# Patient Record
Sex: FEMALE | Race: BLACK OR AFRICAN AMERICAN | NOT HISPANIC OR LATINO | Employment: OTHER | URBAN - METROPOLITAN AREA
[De-identification: names, ages, dates, MRNs, and addresses within clinical notes are randomized per-mention and may not be internally consistent; named-entity substitution may affect disease eponyms.]

---

## 2017-01-10 ENCOUNTER — PATIENT OUTREACH (OUTPATIENT)
Dept: ADMINISTRATIVE | Facility: HOSPITAL | Age: 72
End: 2017-01-10

## 2017-01-10 DIAGNOSIS — E11.59 HYPERTENSION ASSOCIATED WITH DIABETES: Primary | ICD-10-CM

## 2017-01-10 DIAGNOSIS — I15.2 HYPERTENSION ASSOCIATED WITH DIABETES: Primary | ICD-10-CM

## 2017-01-10 RX ORDER — GLIMEPIRIDE 1 MG/1
TABLET ORAL
Qty: 90 TABLET | Refills: 3 | Status: SHIPPED | OUTPATIENT
Start: 2017-01-10 | End: 2017-05-16 | Stop reason: DRUGHIGH

## 2017-01-11 ENCOUNTER — OFFICE VISIT (OUTPATIENT)
Dept: FAMILY MEDICINE | Facility: CLINIC | Age: 72
End: 2017-01-11
Payer: MEDICARE

## 2017-01-11 ENCOUNTER — LAB VISIT (OUTPATIENT)
Dept: LAB | Facility: HOSPITAL | Age: 72
End: 2017-01-11
Attending: FAMILY MEDICINE
Payer: MEDICARE

## 2017-01-11 VITALS
TEMPERATURE: 98 F | HEART RATE: 84 BPM | HEIGHT: 65 IN | BODY MASS INDEX: 29.66 KG/M2 | DIASTOLIC BLOOD PRESSURE: 60 MMHG | SYSTOLIC BLOOD PRESSURE: 100 MMHG | WEIGHT: 178 LBS

## 2017-01-11 DIAGNOSIS — N18.30 DIABETES MELLITUS WITH STAGE 3 CHRONIC KIDNEY DISEASE: ICD-10-CM

## 2017-01-11 DIAGNOSIS — E11.9 TYPE 2 DIABETES MELLITUS WITHOUT COMPLICATION: ICD-10-CM

## 2017-01-11 DIAGNOSIS — E11.59 HYPERTENSION ASSOCIATED WITH DIABETES: ICD-10-CM

## 2017-01-11 DIAGNOSIS — I15.2 HYPERTENSION ASSOCIATED WITH DIABETES: ICD-10-CM

## 2017-01-11 DIAGNOSIS — E61.1 IRON DEFICIENCY: ICD-10-CM

## 2017-01-11 DIAGNOSIS — K59.00 CONSTIPATION, UNSPECIFIED CONSTIPATION TYPE: ICD-10-CM

## 2017-01-11 DIAGNOSIS — R30.0 DYSURIA: ICD-10-CM

## 2017-01-11 DIAGNOSIS — N39.0 URINARY TRACT INFECTION WITHOUT HEMATURIA, SITE UNSPECIFIED: Primary | ICD-10-CM

## 2017-01-11 DIAGNOSIS — E11.22 DIABETES MELLITUS WITH STAGE 3 CHRONIC KIDNEY DISEASE: ICD-10-CM

## 2017-01-11 LAB
ALBUMIN SERPL BCP-MCNC: 3.8 G/DL
ALP SERPL-CCNC: 104 U/L
ALT SERPL W/O P-5'-P-CCNC: 7 U/L
ANION GAP SERPL CALC-SCNC: 7 MMOL/L
AST SERPL-CCNC: 14 U/L
BACTERIA #/AREA URNS HPF: ABNORMAL /HPF
BASOPHILS # BLD AUTO: 0.01 K/UL
BASOPHILS NFR BLD: 0.2 %
BILIRUB SERPL-MCNC: 0.3 MG/DL
BILIRUB UR QL STRIP: ABNORMAL
BUN SERPL-MCNC: 21 MG/DL
CALCIUM SERPL-MCNC: 9.8 MG/DL
CHLORIDE SERPL-SCNC: 105 MMOL/L
CLARITY UR: ABNORMAL
CO2 SERPL-SCNC: 28 MMOL/L
COLOR UR: ABNORMAL
CREAT SERPL-MCNC: 1.3 MG/DL
DIFFERENTIAL METHOD: ABNORMAL
EOSINOPHIL # BLD AUTO: 0.1 K/UL
EOSINOPHIL NFR BLD: 1.9 %
ERYTHROCYTE [DISTWIDTH] IN BLOOD BY AUTOMATED COUNT: 12.8 %
ERYTHROCYTE [SEDIMENTATION RATE] IN BLOOD BY WESTERGREN METHOD: 27 MM/HR
EST. GFR  (AFRICAN AMERICAN): 48.4 ML/MIN/1.73 M^2
EST. GFR  (NON AFRICAN AMERICAN): 42 ML/MIN/1.73 M^2
FERRITIN SERPL-MCNC: 220 NG/ML
GLUCOSE SERPL-MCNC: 128 MG/DL
GLUCOSE UR QL STRIP: ABNORMAL
HCT VFR BLD AUTO: 37.5 %
HGB BLD-MCNC: 11.8 G/DL
HGB UR QL STRIP: ABNORMAL
IRON SERPL-MCNC: 64 UG/DL
KETONES UR QL STRIP: ABNORMAL
LEUKOCYTE ESTERASE UR QL STRIP: ABNORMAL
LYMPHOCYTES # BLD AUTO: 2.1 K/UL
LYMPHOCYTES NFR BLD: 36.5 %
MCH RBC QN AUTO: 29.4 PG
MCHC RBC AUTO-ENTMCNC: 31.5 %
MCV RBC AUTO: 94 FL
MICROSCOPIC COMMENT: ABNORMAL
MONOCYTES # BLD AUTO: 0.3 K/UL
MONOCYTES NFR BLD: 5.7 %
NEUTROPHILS # BLD AUTO: 3.3 K/UL
NEUTROPHILS NFR BLD: 55.7 %
NITRITE UR QL STRIP: ABNORMAL
PH UR STRIP: ABNORMAL [PH] (ref 5–8)
PLATELET # BLD AUTO: 143 K/UL
PMV BLD AUTO: 10.5 FL
POTASSIUM SERPL-SCNC: 4.6 MMOL/L
PROT SERPL-MCNC: 7.9 G/DL
PROT UR QL STRIP: ABNORMAL
RBC # BLD AUTO: 4.01 M/UL
RBC #/AREA URNS HPF: 20 /HPF (ref 0–4)
SATURATED IRON: 18 %
SODIUM SERPL-SCNC: 140 MMOL/L
SP GR UR STRIP: ABNORMAL (ref 1–1.03)
SQUAMOUS #/AREA URNS HPF: 5 /HPF
TOTAL IRON BINDING CAPACITY: 351 UG/DL
TRANSFERRIN SERPL-MCNC: 237 MG/DL
URN SPEC COLLECT METH UR: ABNORMAL
WBC # BLD AUTO: 5.84 K/UL
WBC #/AREA URNS HPF: >100 /HPF (ref 0–5)
WBC CLUMPS URNS QL MICRO: ABNORMAL

## 2017-01-11 PROCEDURE — 99214 OFFICE O/P EST MOD 30 MIN: CPT | Mod: PBBFAC,PO | Performed by: FAMILY MEDICINE

## 2017-01-11 PROCEDURE — 87186 SC STD MICRODIL/AGAR DIL: CPT

## 2017-01-11 PROCEDURE — 87147 CULTURE TYPE IMMUNOLOGIC: CPT

## 2017-01-11 PROCEDURE — 87086 URINE CULTURE/COLONY COUNT: CPT

## 2017-01-11 PROCEDURE — 87088 URINE BACTERIA CULTURE: CPT

## 2017-01-11 PROCEDURE — 99999 PR PBB SHADOW E&M-EST. PATIENT-LVL IV: CPT | Mod: PBBFAC,,, | Performed by: FAMILY MEDICINE

## 2017-01-11 PROCEDURE — 87077 CULTURE AEROBIC IDENTIFY: CPT

## 2017-01-11 PROCEDURE — 99214 OFFICE O/P EST MOD 30 MIN: CPT | Mod: S$PBB,,, | Performed by: FAMILY MEDICINE

## 2017-01-11 PROCEDURE — 81000 URINALYSIS NONAUTO W/SCOPE: CPT | Mod: PO

## 2017-01-11 RX ORDER — POLYETHYLENE GLYCOL 3350 17 G/17G
17 POWDER, FOR SOLUTION ORAL DAILY
Qty: 510 G | COMMUNITY
Start: 2017-01-11 | End: 2019-03-14

## 2017-01-11 RX ORDER — TRAMADOL HYDROCHLORIDE 50 MG/1
50 TABLET ORAL EVERY 8 HOURS PRN
Qty: 30 TABLET | Refills: 0 | Status: SHIPPED | OUTPATIENT
Start: 2017-01-11 | End: 2017-07-06 | Stop reason: SDUPTHER

## 2017-01-11 RX ORDER — CIPROFLOXACIN 250 MG/1
250 TABLET, FILM COATED ORAL 2 TIMES DAILY
Qty: 14 TABLET | Refills: 0 | Status: SHIPPED | OUTPATIENT
Start: 2017-01-11 | End: 2017-01-18

## 2017-01-11 NOTE — MR AVS SNAPSHOT
[facility]  [address] [address] [facility] [address]  Phone: [phone]  Fax: [phone]                  Eunice Hutson   2017 4:00 PM   Office Visit    Description:  Female : 1945   Provider:  Parminder Sahni MD   Department:  [facility]           Reason for Visit     Dysuria           Diagnoses this Visit        Comments    Urinary tract infection without hematuria, site unspecified    -  Primary     Dysuria         Diabetes mellitus with stage 3 chronic kidney disease         Hypertension associated with diabetes         Constipation, unspecified constipation type                To Do List           Future Appointments        Provider Department Dept Phone    2017 2:00 PM Tay Yates MD [facility] On[facility] [phone]      Goals (5 Years of Data)     None       These Medications        Disp Refills Start End    tramadol (ULTRAM) 50 mg tablet 30 tablet 0 2017     Take 1 tablet (50 mg total) by mouth every 8 (eight) hours as needed. - Oral    Pharmacy: [facility] [address] Ph #: [phone]       ciprofloxacin HCl (CIPRO) 250 MG tablet 14 tablet 0 2017    Take 1 tablet (250 mg total) by mouth 2 (two) times daily. - Oral    Pharmacy: [address] Ph #: [phone]         PURCHASE these Medications (No prescription required)        Start End    polyethylene glycol (GLYCOLAX) 17 gram/dose powder 2017     Sig - Route: Take 17 g by mouth once daily. Mix in water or juice - Oral    Class: OTC      [facility]     [facility] On Call Nurse Care Line -  Assistance  Registered nurses in the [facility] provide clinical advisement, health education, appointment booking, and other advisory services.  Call for this free service at [phone].             Medications           Message regarding  Medications     Verify the changes and/or additions to your medication regime listed below are the same as discussed with your clinician today.  If any of these changes or additions are incorrect, please notify your healthcare provider.        START taking these NEW medications        Refills    ciprofloxacin HCl (CIPRO) 250 MG tablet 0    Sig: Take 1 tablet (250 mg total) by mouth 2 (two) times daily.    Class: Normal    Route: Oral    polyethylene glycol (GLYCOLAX) 17 gram/dose powder prn    Sig: Take 17 g by mouth once daily. Mix in water or juice    Class: OTC    Route: Oral      STOP taking these medications     UNKNOWN TO PATIENT unk chol med           Verify that the below list of medications is an accurate representation of the medications you are currently taking.  If none reported, the list may be blank. If incorrect, please contact your healthcare provider. Carry this list with you in case of emergency.           Current Medications     calcium-vitamin D3 (CALCIUM 500 + D) 500 mg(1,250mg) -200 unit per tablet Take 1 tablet by mouth 2 (two) times daily with meals.    cetirizine (ZYRTEC) 10 MG tablet Take 1 tablet (10 mg total) by mouth once daily.    ciprofloxacin HCl (CIPRO) 250 MG tablet Take 1 tablet (250 mg total) by mouth 2 (two) times daily.    cyclobenzaprine (FLEXERIL) 5 MG tablet Take 1 tablet (5 mg total) by mouth 3 (three) times daily as needed for Muscle spasms.    docusate calcium (SURFAK) 240 mg capsule Take by mouth.    gabapentin (NEURONTIN) 300 MG capsule TAKE ONE CAPSULE BY MOUTH TWICE DAILY    glimepiride (AMARYL) 1 MG tablet TAKE ONE TABLET BY MOUTH DAILY BEFORE BREAKFAST    latanoprost (XALATAN) 0.005 % ophthalmic solution Place 1 drop into both eyes every evening.      lisinopril (PRINIVIL,ZESTRIL) 20 MG tablet TAKE ONE TABLET BY MOUTH EVERY DAY    nystatin (MYCOSTATIN) cream Apply topically 2 (two) times daily as needed.    omeprazole (PRILOSEC) 20 MG capsule TAKE TWO CAPSULES BY  "MOUTH EVERY DAY    ondansetron (ZOFRAN) 4 MG tablet TAKE ONE TABLET BY MOUTH EVERY 8 HOURS AS NEEDED FOR NAUSEA    polyethylene glycol (GLYCOLAX) 17 gram/dose powder Take 17 g by mouth once daily. Mix in water or juice    PROCTOZONE-HC 2.5 % rectal cream PLACE RECTALLY TWICE DAILY    ropinirole (REQUIP) 1 MG tablet TAKE ONE TABLET BY MOUTH NIGHTLY    sitagliptan-metformin (JANUMET) 50-1,000 mg per tablet Take 1 tablet by mouth.    tramadol (ULTRAM) 50 mg tablet Take 1 tablet (50 mg total) by mouth every 8 (eight) hours as needed.    triamcinolone acetonide 0.1% (KENALOG) 0.1 % cream Apply topically 2 (two) times daily.           Clinical Reference Information           Vital Signs - Last Recorded  Most recent update: 1/11/2017  3:59 PM by Zahraa Hdz LPN    BP Pulse Temp Ht Wt BMI    100/60 84 97.8 °F (36.6 °C) (Oral) 5' 5" (1.651 m) 80.7 kg (178 lb) 29.62 kg/m2      Blood Pressure          Most Recent Value    BP  100/60      Allergies as of 1/11/2017     Caffeine    Codeine    Lipitor [Atorvastatin]    Pravastatin      Immunizations Administered on Date of Encounter - 1/11/2017     None      Orders Placed During Today's Visit      Normal Orders This Visit    Urinalysis Microscopic     URINALYSIS     Future Labs/Procedures Expected by Expires    Urine culture  As directed 1/11/2018      "

## 2017-01-12 ENCOUNTER — TELEPHONE (OUTPATIENT)
Dept: HEMATOLOGY/ONCOLOGY | Facility: CLINIC | Age: 72
End: 2017-01-12

## 2017-01-12 DIAGNOSIS — D50.9 IRON DEFICIENCY ANEMIA, UNSPECIFIED IRON DEFICIENCY ANEMIA TYPE: Primary | ICD-10-CM

## 2017-01-12 LAB
ESTIMATED AVG GLUCOSE: 148 MG/DL
HBA1C MFR BLD HPLC: 6.8 %

## 2017-01-12 NOTE — TELEPHONE ENCOUNTER
----- Message from Tay Yates MD sent at 1/12/2017  8:14 AM CST -----  Please let the patient know that her recent labwork check by me looked okay with good iron levels and good blood count.  I would like to see the patient back in 4 months at [facility] with CBC, iron studies, ESR, ferritin done 2 days before clinic visit.  Thank you.

## 2017-01-12 NOTE — PROGRESS NOTES
complains of dysuria, frequency, urgency. Symptoms over the past 2 wk. No fever or chills. No significant abdominal or back pain. Took leftover bactrim from son x 3 d and sx resolved, but recurred.  DM borderline, HTN controlled.  Lab Results   Component Value Date    HGBA1C 7.5 (H) 10/10/2016     Chronic constipation.  No weight changes or abdominal pain.  No rectal bleeding.  Tramadol used infrequently joint pains.  Requested refill    Past Medical History:  Past Medical History   Diagnosis Date    Anemia     Blood transfusion     Chronic gastritis     Depression     Diabetes mellitus with stage 3 chronic kidney disease     Diverticulosis     DM type 2 (diabetes mellitus, type 2) 2/27/2012    GERD (gastroesophageal reflux disease)     Glaucoma (increased eye pressure)     Hyperlipidemia LDL goal < 100 2/27/2012    Hypertension goal BP (blood pressure) < 130/80 2/27/2012    RLS (restless legs syndrome)      Past Surgical History   Procedure Laterality Date    Tubal ligation      Breast biopsy      Vaginal delivery       times 2    Back surgery  01/05/2011    Shoulder arthroscopy      Esophagogastroduodenoscopy  11/17/2011    Colonoscopy  11/16/2011    Eye surgery      Foot surgery        derotational arthroplasty of the fifth digit right foot     Esophagogastroduodenoscopy  1/16/2013,2/29/16    Colonoscopy  1/24/1013    Hemorrhoid surgery       Review of patient's allergies indicates:   Allergen Reactions    Caffeine Other (See Comments)     Makes hyper    Codeine      Other reaction(s): Rash  Able to take Lortab    Lipitor [atorvastatin] Other (See Comments)     Leg cramps    Pravastatin Other (See Comments)     Leg cramps     Current Outpatient Prescriptions on File Prior to Visit   Medication Sig Dispense Refill    calcium-vitamin D3 (CALCIUM 500 + D) 500 mg(1,250mg) -200 unit per tablet Take 1 tablet by mouth 2 (two) times daily with meals.      cyclobenzaprine (FLEXERIL) 5 MG  tablet Take 1 tablet (5 mg total) by mouth 3 (three) times daily as needed for Muscle spasms. 30 tablet 1    docusate calcium (SURFAK) 240 mg capsule Take by mouth.      gabapentin (NEURONTIN) 300 MG capsule TAKE ONE CAPSULE BY MOUTH TWICE DAILY 180 capsule 0    glimepiride (AMARYL) 1 MG tablet TAKE ONE TABLET BY MOUTH DAILY BEFORE BREAKFAST 90 tablet 3    latanoprost (XALATAN) 0.005 % ophthalmic solution Place 1 drop into both eyes every evening.        lisinopril (PRINIVIL,ZESTRIL) 20 MG tablet TAKE ONE TABLET BY MOUTH EVERY DAY 90 tablet 3    nystatin (MYCOSTATIN) cream Apply topically 2 (two) times daily as needed. 30 g 1    omeprazole (PRILOSEC) 20 MG capsule TAKE TWO CAPSULES BY MOUTH EVERY  capsule 3    ondansetron (ZOFRAN) 4 MG tablet TAKE ONE TABLET BY MOUTH EVERY 8 HOURS AS NEEDED FOR NAUSEA 12 tablet 0    PROCTOZONE-HC 2.5 % rectal cream PLACE RECTALLY TWICE DAILY 30 g 1    ropinirole (REQUIP) 1 MG tablet TAKE ONE TABLET BY MOUTH NIGHTLY 90 tablet 3    sitagliptan-metformin (JANUMET) 50-1,000 mg per tablet Take 1 tablet by mouth.      triamcinolone acetonide 0.1% (KENALOG) 0.1 % cream Apply topically 2 (two) times daily. 30 g 1    [DISCONTINUED] cyclobenzaprine (FLEXERIL) 10 MG tablet Take 10 mg by mouth.      [DISCONTINUED] glimepiride (AMARYL) 1 MG tablet Take 1 mg by mouth 2 (two) times daily.      [DISCONTINUED] tramadol (ULTRAM) 50 mg tablet Take 1 tablet (50 mg total) by mouth every 8 (eight) hours as needed. 30 tablet 0    cetirizine (ZYRTEC) 10 MG tablet Take 1 tablet (10 mg total) by mouth once daily. 30 tablet 0    [DISCONTINUED] lisinopril 10 MG tablet Take 10 mg by mouth.      [DISCONTINUED] omeprazole (PRILOSEC) 40 MG capsule Take 40 mg by mouth.      [DISCONTINUED] ropinirole (REQUIP) 1 MG tablet Take 1 mg by mouth.      [DISCONTINUED] tramadol (ULTRAM) 50 mg tablet Take 50 mg by mouth.      [DISCONTINUED] UNKNOWN TO PATIENT unk chol med       No current  "facility-administered medications on file prior to visit.      Social History     Social History    Marital status:      Spouse name: N/A    Number of children: N/A    Years of education: N/A     Occupational History    Not on file.     Social History Main Topics    Smoking status: Never Smoker    Smokeless tobacco: Not on file    Alcohol use No    Drug use: No    Sexual activity: Not on file     Other Topics Concern    Not on file     Social History Narrative     Family History   Problem Relation Age of Onset    Heart disease Mother     Prostate cancer Father     Lung cancer Father     Cancer Sister     Colon cancer Brother 43    Cancer Brother      Colon    Kidney disease Brother      Kidney transplant    Diabetes Sister        Vitals:    01/11/17 1558   BP: 100/60   Pulse: 84   Temp: 97.8 °F (36.6 °C)   TempSrc: Oral   Weight: 80.7 kg (178 lb)   Height: 5' 5" (1.651 m)       Wt Readings from Last 3 Encounters:   01/11/17 80.7 kg (178 lb)   09/07/16 81.2 kg (179 lb)   08/30/16 81.2 kg (179 lb)       APPEARANCE: Well nourished, well developed, in no acute distress.    HEAD: Normocephalic.  Atraumatic.  EYES:   Right eye: Pupil reactive.  Conjunctiva clear.    Left eye: Pupil reactive.  Conjunctiva clear.    NECK: Supple. No bruits.  No JVD.  No cervical lymphadenopathy.  No thyromegaly.    CHEST: Breath sounds clear bilaterally.  Normal respiratory effort  CARDIOVASCULAR: Normal rate.  Regular rhythm.  No murmurs.  No rub.  No gallops.   No edema.  MENTAL STATUS: Alert.  Oriented x 3.    Urinalysis consistent with UTI     Eunice was seen today for dysuria.    Diagnoses and all orders for this visit:    Urinary tract infection without hematuria, site unspecified  -     Urine culture; Future  -     Urine culture    Dysuria  -     URINALYSIS    Diabetes mellitus with stage 3 chronic kidney disease    Hypertension associated with diabetes    Constipation, unspecified constipation type    Other " orders  -     Urinalysis Microscopic  -     tramadol (ULTRAM) 50 mg tablet; Take 1 tablet (50 mg total) by mouth every 8 (eight) hours as needed.  -     ciprofloxacin HCl (CIPRO) 250 MG tablet; Take 1 tablet (250 mg total) by mouth 2 (two) times daily.  -     polyethylene glycol (GLYCOLAX) 17 gram/dose powder; Take 17 g by mouth once daily. Mix in water or juice          Plan:   Increase fluids. . Follow up as needed if not resolving in the next couple days

## 2017-01-12 NOTE — TELEPHONE ENCOUNTER
----- Message from Amol Raman sent at 1/12/2017  2:35 PM CST -----  Contact: pt  She's calling in regards to a missed call, please advise, [phone] (cell)

## 2017-01-14 LAB
BACTERIA UR CULT: NORMAL
BACTERIA UR CULT: NORMAL

## 2017-01-31 ENCOUNTER — TELEPHONE (OUTPATIENT)
Dept: FAMILY MEDICINE | Facility: CLINIC | Age: 72
End: 2017-01-31

## 2017-01-31 NOTE — TELEPHONE ENCOUNTER
C/o feeling feverish with chills (did not check temp).  Productive cough, sore throat, runny nose, thinks she has the flu.  Using Benadryl, Advil and drinking lots of water.  Please advise

## 2017-01-31 NOTE — TELEPHONE ENCOUNTER
----- Message from Sherine Rocha sent at 1/31/2017  2:04 PM CST -----  Contact: pt   Pt states that she nee something called in for the flu.    ..[phone] (home)       ..  [facility] - [address] [address]  Phone: [phone] Fax: [phone]

## 2017-02-02 ENCOUNTER — TELEPHONE (OUTPATIENT)
Dept: FAMILY MEDICINE | Facility: CLINIC | Age: 72
End: 2017-02-02

## 2017-02-02 DIAGNOSIS — I10 ESSENTIAL HYPERTENSION: ICD-10-CM

## 2017-02-02 DIAGNOSIS — E78.5 HYPERLIPIDEMIA, UNSPECIFIED HYPERLIPIDEMIA TYPE: ICD-10-CM

## 2017-02-02 DIAGNOSIS — E11.9 TYPE 2 DIABETES MELLITUS WITHOUT COMPLICATION, WITHOUT LONG-TERM CURRENT USE OF INSULIN: Primary | ICD-10-CM

## 2017-02-02 NOTE — TELEPHONE ENCOUNTER
----- Message from Parminder Sahni MD sent at 2/1/2017  7:07 PM CST -----  Lab ok .  Schedule lipid panel, ALT, hemoglobin A1c, BMP, urine microalbumin in beginning of July  My nurse will contact you to arrange.  Thanks,  Dr. Sahni    Results released on [facility]

## 2017-04-11 ENCOUNTER — OFFICE VISIT (OUTPATIENT)
Dept: FAMILY MEDICINE | Facility: CLINIC | Age: 72
End: 2017-04-11
Payer: MEDICARE

## 2017-04-11 ENCOUNTER — TELEPHONE (OUTPATIENT)
Dept: INTERNAL MEDICINE | Facility: CLINIC | Age: 72
End: 2017-04-11

## 2017-04-11 VITALS
BODY MASS INDEX: 29.66 KG/M2 | HEIGHT: 65 IN | HEART RATE: 79 BPM | WEIGHT: 178 LBS | DIASTOLIC BLOOD PRESSURE: 68 MMHG | SYSTOLIC BLOOD PRESSURE: 127 MMHG | TEMPERATURE: 98 F

## 2017-04-11 DIAGNOSIS — N30.00 ACUTE CYSTITIS WITHOUT HEMATURIA: Primary | ICD-10-CM

## 2017-04-11 LAB
BACTERIA #/AREA URNS HPF: ABNORMAL /HPF
BILIRUB UR QL STRIP: ABNORMAL
CLARITY UR: CLEAR
COLOR UR: ABNORMAL
GLUCOSE UR QL STRIP: ABNORMAL
HGB UR QL STRIP: ABNORMAL
KETONES UR QL STRIP: ABNORMAL
LEUKOCYTE ESTERASE UR QL STRIP: ABNORMAL
MICROSCOPIC COMMENT: ABNORMAL
NITRITE UR QL STRIP: ABNORMAL
PH UR STRIP: ABNORMAL [PH] (ref 5–8)
PROT UR QL STRIP: ABNORMAL
RBC #/AREA URNS HPF: 3 /HPF (ref 0–4)
SP GR UR STRIP: ABNORMAL (ref 1–1.03)
SQUAMOUS #/AREA URNS HPF: 4 /HPF
URN SPEC COLLECT METH UR: ABNORMAL
WBC #/AREA URNS HPF: 10 /HPF (ref 0–5)

## 2017-04-11 PROCEDURE — 87088 URINE BACTERIA CULTURE: CPT

## 2017-04-11 PROCEDURE — 81000 URINALYSIS NONAUTO W/SCOPE: CPT | Mod: PO

## 2017-04-11 PROCEDURE — 87186 SC STD MICRODIL/AGAR DIL: CPT

## 2017-04-11 PROCEDURE — 99215 OFFICE O/P EST HI 40 MIN: CPT | Mod: PBBFAC,PO

## 2017-04-11 PROCEDURE — 87086 URINE CULTURE/COLONY COUNT: CPT

## 2017-04-11 PROCEDURE — 99999 PR PBB SHADOW E&M-EST. PATIENT-LVL V: CPT | Mod: PBBFAC,,,

## 2017-04-11 PROCEDURE — 87077 CULTURE AEROBIC IDENTIFY: CPT

## 2017-04-11 PROCEDURE — 99213 OFFICE O/P EST LOW 20 MIN: CPT | Mod: S$PBB,,,

## 2017-04-11 RX ORDER — SULFAMETHOXAZOLE AND TRIMETHOPRIM 800; 160 MG/1; MG/1
1 TABLET ORAL 2 TIMES DAILY
Qty: 20 TABLET | Refills: 0 | Status: SHIPPED | OUTPATIENT
Start: 2017-04-11 | End: 2017-04-21

## 2017-04-11 RX ORDER — PHENAZOPYRIDINE HYDROCHLORIDE 100 MG/1
100 TABLET, FILM COATED ORAL 3 TIMES DAILY PRN
Qty: 9 TABLET | Refills: 0 | Status: SHIPPED | OUTPATIENT
Start: 2017-04-11 | End: 2017-04-14

## 2017-04-11 NOTE — TELEPHONE ENCOUNTER
----- Message from Netta Loo sent at 4/11/2017  2:34 PM CDT -----  States have questions regarding patient Rx for Bactrium.    Pt use..    [facility] - [address] [address]  Phone: [phone] Fax: [phone]

## 2017-04-11 NOTE — TELEPHONE ENCOUNTER
Pharmacist wants to clarify that it is ok for patient to take Bactrim with her Lisinopril. New interaction between the two in the elderly with renal failure can cause elevated potassium. Please review and advise.

## 2017-04-11 NOTE — TELEPHONE ENCOUNTER
I call the pharmacy and changed the antibiotic from Bactrim DS to Macrobid 100 mg by mouth twice a day ×10 days

## 2017-04-12 ENCOUNTER — TELEPHONE (OUTPATIENT)
Dept: INTERNAL MEDICINE | Facility: CLINIC | Age: 72
End: 2017-04-12

## 2017-04-12 NOTE — TELEPHONE ENCOUNTER
----- Message from Alina Marie sent at 4/12/2017 10:45 AM CDT -----  Pt miss the office call . Please call her at [phone] concerning test results.

## 2017-04-12 NOTE — PROGRESS NOTES
Subjective:       Patient ID: Eunice Hutson is a 72 y.o. female.    Chief Complaint: Dysuria    HPI   The patient presents with a 2 day complaint of dysuria that she describes as an intermittent moderate intensity burning sensation at her urinary meatus during and after urination.  She voices some associated urinary urgency and frequency.  She denies any hematuria, flank pain, fever, or abdominal pain.  The patient cannot identify exacerbating or mitigating factors.  She has not taken anything for relief.     Review of Systems   Constitutional: Negative for activity change, appetite change, fatigue, fever and unexpected weight change.   HENT: Negative.    Eyes: Negative.    Respiratory: Negative for cough, chest tightness, shortness of breath and wheezing.    Cardiovascular: Negative for chest pain, palpitations and leg swelling.   Gastrointestinal: Negative for abdominal pain, constipation, diarrhea, nausea and vomiting.   Endocrine: Negative.    Genitourinary: Positive for dysuria, frequency and urgency. Negative for flank pain and hematuria.   Musculoskeletal: Negative.    Skin: Negative for color change.   Allergic/Immunologic: Negative.    Neurological: Negative for dizziness, weakness and light-headedness.   Hematological: Negative.    Psychiatric/Behavioral: Negative for sleep disturbance.         Objective:      Physical Exam   Constitutional: She is oriented to person, place, and time. She appears well-developed and well-nourished.   HENT:   Head: Normocephalic and atraumatic.   Eyes: Conjunctivae are normal. Pupils are equal, round, and reactive to light. No scleral icterus.   Neck: Normal range of motion. Neck supple.   Cardiovascular: Normal rate, regular rhythm, normal heart sounds and intact distal pulses.  Exam reveals no gallop and no friction rub.    No murmur heard.  Pulmonary/Chest: Effort normal and breath sounds normal. No respiratory distress. She has no wheezes. She has no rales. She  exhibits no tenderness.   Abdominal: Soft. Normal appearance and bowel sounds are normal. She exhibits no shifting dullness, no distension, no pulsatile liver, no fluid wave, no abdominal bruit, no ascites, no pulsatile midline mass and no mass. There is no hepatosplenomegaly. There is no tenderness. There is no rigidity, no rebound, no guarding, no CVA tenderness, no tenderness at McBurney's point and negative Choi's sign. No hernia.   Musculoskeletal: Normal range of motion.   Lymphadenopathy:     She has no cervical adenopathy.   Neurological: She is alert and oriented to person, place, and time. She exhibits normal muscle tone. Coordination normal.   Skin: Skin is warm and dry. No rash noted. No erythema. No pallor.   Psychiatric: She has a normal mood and affect. Her behavior is normal. Judgment and thought content normal.   Vitals reviewed.      Assessment:       1. Acute cystitis without hematuria          Plan:   Acute cystitis without hematuria  -     sulfamethoxazole-trimethoprim 800-160mg (BACTRIM DS) 800-160 mg Tab; Take 1 tablet by mouth 2 (two) times daily.  Dispense: 20 tablet; Refill: 0  -     Urine culture  -     Urinalysis  -     phenazopyridine (PYRIDIUM) 100 MG tablet; Take 1 tablet (100 mg total) by mouth 3 (three) times daily as needed for Pain.  Dispense: 9 tablet; Refill: 0    Other orders  -     Urinalysis Microscopic            Disclaimer: This note is prepared using voice recognition software.  As such there may be errors in the dictation.  It has not been proofread.

## 2017-04-15 DIAGNOSIS — N30.00 ACUTE CYSTITIS WITHOUT HEMATURIA: Primary | ICD-10-CM

## 2017-04-15 LAB — BACTERIA UR CULT: NORMAL

## 2017-04-15 RX ORDER — AMOXICILLIN AND CLAVULANATE POTASSIUM 875; 125 MG/1; MG/1
1 TABLET, FILM COATED ORAL EVERY 12 HOURS
Qty: 20 TABLET | Refills: 0 | Status: SHIPPED | OUTPATIENT
Start: 2017-04-15 | End: 2017-04-17 | Stop reason: SDUPTHER

## 2017-04-17 ENCOUNTER — TELEPHONE (OUTPATIENT)
Dept: FAMILY MEDICINE | Facility: CLINIC | Age: 72
End: 2017-04-17

## 2017-04-17 DIAGNOSIS — N30.00 ACUTE CYSTITIS WITHOUT HEMATURIA: ICD-10-CM

## 2017-04-17 RX ORDER — AMOXICILLIN AND CLAVULANATE POTASSIUM 875; 125 MG/1; MG/1
1 TABLET, FILM COATED ORAL EVERY 12 HOURS
Qty: 20 TABLET | Refills: 0 | Status: SHIPPED | OUTPATIENT
Start: 2017-04-17 | End: 2017-04-27

## 2017-04-17 NOTE — TELEPHONE ENCOUNTER
The oatient called and states that the antibiotics I ordered for her culture results were not at the pharmacy. I will call in another prescription to her usual pharmacy,

## 2017-05-05 RX ORDER — GABAPENTIN 300 MG/1
CAPSULE ORAL
Qty: 180 CAPSULE | Refills: 0 | Status: SHIPPED | OUTPATIENT
Start: 2017-05-05 | End: 2017-08-28 | Stop reason: SDUPTHER

## 2017-05-12 ENCOUNTER — LAB VISIT (OUTPATIENT)
Dept: LAB | Facility: HOSPITAL | Age: 72
End: 2017-05-12
Attending: INTERNAL MEDICINE
Payer: MEDICARE

## 2017-05-12 DIAGNOSIS — D50.9 IRON DEFICIENCY ANEMIA, UNSPECIFIED IRON DEFICIENCY ANEMIA TYPE: ICD-10-CM

## 2017-05-12 LAB
BASOPHILS # BLD AUTO: 0.01 K/UL
BASOPHILS NFR BLD: 0.3 %
DIFFERENTIAL METHOD: ABNORMAL
EOSINOPHIL # BLD AUTO: 0.1 K/UL
EOSINOPHIL NFR BLD: 1.4 %
ERYTHROCYTE [DISTWIDTH] IN BLOOD BY AUTOMATED COUNT: 13.8 %
ERYTHROCYTE [SEDIMENTATION RATE] IN BLOOD BY WESTERGREN METHOD: 30 MM/HR
FERRITIN SERPL-MCNC: 224 NG/ML
HCT VFR BLD AUTO: 35.2 %
HGB BLD-MCNC: 11.1 G/DL
IRON SERPL-MCNC: 62 UG/DL
LYMPHOCYTES # BLD AUTO: 1.6 K/UL
LYMPHOCYTES NFR BLD: 45.3 %
MCH RBC QN AUTO: 29.4 PG
MCHC RBC AUTO-ENTMCNC: 31.5 %
MCV RBC AUTO: 93 FL
MONOCYTES # BLD AUTO: 0.3 K/UL
MONOCYTES NFR BLD: 8 %
NEUTROPHILS # BLD AUTO: 1.6 K/UL
NEUTROPHILS NFR BLD: 45 %
PLATELET # BLD AUTO: 197 K/UL
PMV BLD AUTO: 10.8 FL
RBC # BLD AUTO: 3.77 M/UL
SATURATED IRON: 19 %
TOTAL IRON BINDING CAPACITY: 323 UG/DL
TRANSFERRIN SERPL-MCNC: 218 MG/DL
WBC # BLD AUTO: 3.49 K/UL

## 2017-05-12 PROCEDURE — 85025 COMPLETE CBC W/AUTO DIFF WBC: CPT | Mod: PO

## 2017-05-12 PROCEDURE — 36415 COLL VENOUS BLD VENIPUNCTURE: CPT | Mod: PO

## 2017-05-12 PROCEDURE — 82728 ASSAY OF FERRITIN: CPT

## 2017-05-12 PROCEDURE — 83540 ASSAY OF IRON: CPT

## 2017-05-12 PROCEDURE — 85651 RBC SED RATE NONAUTOMATED: CPT | Mod: PO

## 2017-05-16 ENCOUNTER — TELEPHONE (OUTPATIENT)
Dept: FAMILY MEDICINE | Facility: CLINIC | Age: 72
End: 2017-05-16

## 2017-05-16 RX ORDER — GLIMEPIRIDE 1 MG/1
1 TABLET ORAL
COMMUNITY
End: 2018-02-06

## 2017-05-16 NOTE — TELEPHONE ENCOUNTER
Please update medication card to reflect what she is taking and we will see what her blood work looks like when she has it checked this summer.  Have her go ahead and send in some glucose readings now

## 2017-05-16 NOTE — TELEPHONE ENCOUNTER
Patient told her pharmacist she only takes the Janumet once daily, not twice (changed on card by an LPN not in this clinic) and she is only taking the glimepiride once every so often and her sugars have been fine..  He wanted to make you aware.

## 2017-05-16 NOTE — TELEPHONE ENCOUNTER
----- Message from Gema Rodriguez sent at 5/16/2017 10:18 AM CDT -----  Contact: Kimberly with [facility]  [phone]  There is some discrepancy with what the pt is and is not actually taking per a conversation  kimberly had with the pt this morning.  Please call Mr Graves at the above number to discuss the situation.

## 2017-05-17 ENCOUNTER — OFFICE VISIT (OUTPATIENT)
Dept: HEMATOLOGY/ONCOLOGY | Facility: CLINIC | Age: 72
End: 2017-05-17
Payer: MEDICARE

## 2017-05-17 VITALS
DIASTOLIC BLOOD PRESSURE: 72 MMHG | OXYGEN SATURATION: 99 % | SYSTOLIC BLOOD PRESSURE: 118 MMHG | HEIGHT: 65 IN | WEIGHT: 177.5 LBS | TEMPERATURE: 98 F | HEART RATE: 74 BPM | BODY MASS INDEX: 29.57 KG/M2

## 2017-05-17 DIAGNOSIS — D50.9 IRON DEFICIENCY ANEMIA, UNSPECIFIED IRON DEFICIENCY ANEMIA TYPE: ICD-10-CM

## 2017-05-17 PROCEDURE — 99213 OFFICE O/P EST LOW 20 MIN: CPT | Mod: PBBFAC,PO | Performed by: INTERNAL MEDICINE

## 2017-05-17 PROCEDURE — 99214 OFFICE O/P EST MOD 30 MIN: CPT | Mod: S$PBB,,, | Performed by: INTERNAL MEDICINE

## 2017-05-17 PROCEDURE — 99999 PR PBB SHADOW E&M-EST. PATIENT-LVL III: CPT | Mod: PBBFAC,,, | Performed by: INTERNAL MEDICINE

## 2017-05-17 NOTE — PROGRESS NOTES
Reason for visit: Iron deficiency anemia    HPI:   The patient is a 72-year-old  female with a history of chronic iron deficiency anemia who presents to the hematology oncology clinic today for follow-up.  The patient was previously evaluated in the outpatient hematology oncology clinic by Dr. Edd Flanagan.  The patient was last seen by Dr. Flanagan in 2013 and did not keep her scheduled follow-up with him.  She states that she did not see any progress in the treatment of her anemia and so she stopped her follow-up.  She reports that she stop her oral iron supplementation as it was causing severe problems with constipation despite using over-the-counter preparations.  I have reviewed all of the patient's relevant clinical history available in the medical record and have utilized this in my evaluation and management recommendations today.  She denies any chest pain or shortness of breath.  She reports chronic fatigue.  She denies any melena, hematochezia, hematemesis, hemoptysis or hematuria.  She denies any bowel or urinary complaints.  She denies any nausea, vomiting or abdominal pain.  She reports that she follows up regularly with her gastroenterologist Dr. Hampton and is up-to-date with colonoscopy.  She also recalls having had an EGD about 3 years ago.  I did obtain the records from Dr. Hampton's office and have reviewed them.  No etiology for her iron deficiency anemia was noted when her evaluations were performed in 2013.  From the records it appears that the patient did not have a small bowel evaluation at that time.  The patient reports that she tolerated her recent IV iron infusions in January 2016 well without any significant side effects.    PAST MEDICAL HISTORY:   1.  Hypertension  2.  GERD  3.  Type 2 diabetes mellitus with peripheral neuropathy  4.  Osteoarthritis with degenerative disc disease  5.  Diverticulosis  6.  Dyslipidemia    SURGICAL HISTORY:   1.  Hemorrhoidectomy  2.  Right  shoulder arthroscopy  3.  Back surgery for disc disease  4.  Left breast lumpectomy the results of which were benign    FAMILY HISTORY: The patient's brother  of complications related to colon cancer at the age of 44.  Her father had lung cancer in his 80s.  Her sister had ovarian cancer at the age of 58.  She denies any other immediate family members with cancer or bleeding/clotting disorders.    SOCIAL HISTORY: She has never smoked cigarettes.  She does not drink alcohol.  She has never used any recreational drugs.  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  She is  and has 2 sons.  She lives in [place], [place].    ALLERGIES: Reviewed on medication card.    MEDICATIONS: [Medcard has been reviewed and/or reconciled.]    REVIEW OF SYSTEMS:   GENERAL: [No fevers, chills or sweats. Reports fatigue. Denies weight loss or loss of appetite.]  HEENT: [No blurred vision, tinnitus, nasal discharge, sorethroat or dysphagia.]  HEART: [No chest pain, palpitations or shortness of breath.]    LUNGS: [No cough, hemoptysis or breathing problems.]  ABDOMEN: [No abdominal pain, nausea, vomiting, diarrhea, constipation or melena.]  GENITOURINARY: [No dysuria, bleeding or malodorous discharge.]  NEURO: [No headache, dizziness or vertigo.]  HEMATOLOGY: [No easy bruising, spontaneous bleeding or blood clots in the past].  MUSCULOSKELETAL: [No arthralgias, myalgias or bone pains.]  SKIN: [No rashes or skin lesions.]  PSYCHIATRY: [No depression or anxiety.]    PHYSICAL EXAMINATION:   VS: Reviewed on nurse's notes.  APPEARANCE: The patient is a well-developed, well-nourished and well-groomed -American female who appears in no acute distress.  HEENT: No scleral icterus. Both external auditory canals clear. No oral ulcers, lesions. Throat clear  HEAD: No sinus tenderness.  NECK: Supple. No palpable lymphadenopathy. Thyroid non-tender, no palpable masses  CHEST: Breath sounds clear bilaterally. No rales. No  rhonchi. Unlabored respirations.  CARDIOVASCULAR: Normal S1, S2. Normal rate. Regular rhythm.  ABDOMEN: Bowel sounds normal. No tenderness. No abdominal distention. No hepatomegaly. No splenomegaly.  LYMPHATIC: No palpable supraclavicular, axillary nodes  EXTREMITIES: No clubbing, cyanosis, edema  SKIN: No lesions. No petechiae. No ecchymoses. No induration or nodules  NEUROLOGIC: No focal findings. Alert & Oriented x 3. Mood appropriate to affect    LABS:   Reviewed    IMAGING:  Reviewed    IMPRESSION:  1.  Iron deficiency anemia    PLAN:  1.  I had a detailed discussion with the patient today with regard to the various possible etiologies for her chronic iron deficiency anemia.  2.  We discussed about the treatment of her iron deficiency anemia in detail.  At this time she has completed IV iron infusions.  Her most recent lab work included CBC results from today shows resolution of iron deficiency at this time after treatment.  3.  Recent UA to evaluate for any evidence of microhematuria shows no evidence of this.  4. She states that she is uptodate with follow up with Dr. Hampton with regard to GI workup.    Follow up in 6 months at [facility] with labs 2 days before. She knows to call sooner for any additional questions or new problems.    Tay Yates MD

## 2017-06-02 ENCOUNTER — OFFICE VISIT (OUTPATIENT)
Dept: FAMILY MEDICINE | Facility: CLINIC | Age: 72
End: 2017-06-02
Payer: MEDICARE

## 2017-06-02 VITALS
TEMPERATURE: 99 F | HEIGHT: 65 IN | DIASTOLIC BLOOD PRESSURE: 68 MMHG | WEIGHT: 177.56 LBS | SYSTOLIC BLOOD PRESSURE: 122 MMHG | HEART RATE: 77 BPM | BODY MASS INDEX: 29.58 KG/M2

## 2017-06-02 DIAGNOSIS — N39.0 RECURRENT UTI: Primary | ICD-10-CM

## 2017-06-02 DIAGNOSIS — R35.0 FREQUENCY OF URINATION: ICD-10-CM

## 2017-06-02 DIAGNOSIS — R30.0 DYSURIA: ICD-10-CM

## 2017-06-02 LAB
BACTERIA #/AREA URNS HPF: ABNORMAL /HPF
BILIRUB UR QL STRIP: ABNORMAL
CLARITY UR: ABNORMAL
COLOR UR: ABNORMAL
GLUCOSE UR QL STRIP: ABNORMAL
HGB UR QL STRIP: ABNORMAL
KETONES UR QL STRIP: ABNORMAL
LEUKOCYTE ESTERASE UR QL STRIP: ABNORMAL
MICROSCOPIC COMMENT: ABNORMAL
NITRITE UR QL STRIP: ABNORMAL
PH UR STRIP: ABNORMAL [PH] (ref 5–8)
PROT UR QL STRIP: ABNORMAL
RBC #/AREA URNS HPF: 8 /HPF (ref 0–4)
SP GR UR STRIP: ABNORMAL (ref 1–1.03)
SQUAMOUS #/AREA URNS HPF: 5 /HPF
URN SPEC COLLECT METH UR: ABNORMAL
WBC #/AREA URNS HPF: >100 /HPF (ref 0–5)
WBC CLUMPS URNS QL MICRO: ABNORMAL

## 2017-06-02 PROCEDURE — 87077 CULTURE AEROBIC IDENTIFY: CPT

## 2017-06-02 PROCEDURE — 87088 URINE BACTERIA CULTURE: CPT

## 2017-06-02 PROCEDURE — 81000 URINALYSIS NONAUTO W/SCOPE: CPT | Mod: PO

## 2017-06-02 PROCEDURE — 99213 OFFICE O/P EST LOW 20 MIN: CPT | Mod: S$PBB,,, | Performed by: NURSE PRACTITIONER

## 2017-06-02 PROCEDURE — 99215 OFFICE O/P EST HI 40 MIN: CPT | Mod: PBBFAC,PO | Performed by: NURSE PRACTITIONER

## 2017-06-02 PROCEDURE — 87186 SC STD MICRODIL/AGAR DIL: CPT

## 2017-06-02 PROCEDURE — 87086 URINE CULTURE/COLONY COUNT: CPT

## 2017-06-02 PROCEDURE — 99999 PR PBB SHADOW E&M-EST. PATIENT-LVL V: CPT | Mod: PBBFAC,,, | Performed by: NURSE PRACTITIONER

## 2017-06-02 RX ORDER — NITROFURANTOIN 25; 75 MG/1; MG/1
100 CAPSULE ORAL 2 TIMES DAILY
Qty: 20 CAPSULE | Refills: 0 | Status: SHIPPED | OUTPATIENT
Start: 2017-06-02 | End: 2017-06-12

## 2017-06-02 RX ORDER — PHENAZOPYRIDINE HYDROCHLORIDE 100 MG/1
100 TABLET, FILM COATED ORAL 2 TIMES DAILY PRN
Qty: 6 TABLET | Refills: 0 | Status: SHIPPED | OUTPATIENT
Start: 2017-06-02 | End: 2017-06-05

## 2017-06-02 NOTE — PROGRESS NOTES
Subjective:       Patient ID: Eunice Hutson is a 72 y.o. female.    Chief Complaint: Cystitis (possible bladder infection)    Dysuria    This is a new problem. The current episode started in the past 7 days. The problem occurs every urination. The problem has been unchanged. The quality of the pain is described as burning. The pain is moderate. There has been no fever. There is no history of pyelonephritis. Associated symptoms include frequency and urgency. Pertinent negatives include no behavior changes, chills, discharge, flank pain, hematuria, hesitancy, nausea, possible pregnancy, sweats, vomiting, weight loss, bubble bath use, constipation, rash or withholding. She has tried nothing for the symptoms. The treatment provided no relief. Her past medical history is significant for diabetes mellitus, hypertension and recurrent UTIs. There is no history of catheterization, diabetes insipidus, genitourinary reflux, kidney stones, a single kidney, STD, urinary stasis or a urological procedure.     Past Medical History:   Diagnosis Date    Anemia     Blood transfusion     Chronic gastritis     Depression     Diabetes mellitus with stage 3 chronic kidney disease     Diverticulosis     DM type 2 (diabetes mellitus, type 2) 2/27/2012    GERD (gastroesophageal reflux disease)     Glaucoma (increased eye pressure)     Hyperlipidemia LDL goal < 100 2/27/2012    Hypertension goal BP (blood pressure) < 130/80 2/27/2012    RLS (restless legs syndrome)      Social History     Social History    Marital status:      Spouse name: N/A    Number of children: N/A    Years of education: N/A     Occupational History         Social History Main Topics    Smoking status: Never Smoker    Smokeless tobacco: Not on file    Alcohol use No    Drug use: No    Sexual activity: Not on file     Past Surgical History:   Procedure Laterality Date    BACK SURGERY  01/05/2011    BREAST BIOPSY      COLONOSCOPY   11/16/2011    COLONOSCOPY  1/24/1013    ESOPHAGOGASTRODUODENOSCOPY  11/17/2011    ESOPHAGOGASTRODUODENOSCOPY  1/16/2013,2/29/16    EYE SURGERY      FOOT SURGERY       derotational arthroplasty of the fifth digit right foot     HEMORRHOID SURGERY      SHOULDER ARTHROSCOPY      TUBAL LIGATION      VAGINAL DELIVERY      times 2       Review of Systems   Constitutional: Negative.  Negative for chills and weight loss.   HENT: Negative.    Eyes: Negative.    Respiratory: Negative.    Cardiovascular: Negative.    Gastrointestinal: Negative.  Negative for constipation, nausea and vomiting.   Endocrine: Negative.    Genitourinary: Positive for dysuria, frequency and urgency. Negative for flank pain, hematuria and hesitancy.   Musculoskeletal: Negative.    Skin: Negative.  Negative for rash.   Allergic/Immunologic: Negative.    Neurological: Negative.    Psychiatric/Behavioral: Negative.        Objective:      Physical Exam   Constitutional: She is oriented to person, place, and time. She appears well-developed and well-nourished.   HENT:   Head: Normocephalic.   Right Ear: External ear normal.   Left Ear: External ear normal.   Nose: Nose normal.   Mouth/Throat: Oropharynx is clear and moist.   Eyes: Conjunctivae are normal. Pupils are equal, round, and reactive to light.   Neck: Normal range of motion. Neck supple.   Cardiovascular: Normal rate, regular rhythm and normal heart sounds.    Pulmonary/Chest: Effort normal and breath sounds normal.   Abdominal: Soft. Bowel sounds are normal. There is no tenderness. There is no CVA tenderness.   Musculoskeletal: Normal range of motion.   Neurological: She is alert and oriented to person, place, and time.   Skin: Skin is warm and dry. Capillary refill takes 2 to 3 seconds.   Psychiatric: She has a normal mood and affect. Her behavior is normal. Judgment and thought content normal.   Nursing note and vitals reviewed.      Assessment:       1. Recurrent UTI    2. Dysuria     3. Frequency of urination        Plan:           Eunice was seen today for cystitis.    Diagnoses and all orders for this visit:    Recurrent UTI  Dysuria  Frequency of urination  -     Urinalysis  -     CULTURE, URINE  -     Ambulatory referral to Urology  -     Urinalysis Microscopic  -     nitrofurantoin, macrocrystal-monohydrate, (MACROBID) 100 MG capsule; Take 1 capsule (100 mg total) by mouth 2 (two) times daily.  -     phenazopyridine (PYRIDIUM) 100 MG tablet; Take 1 tablet (100 mg total) by mouth 2 (two) times daily as needed.

## 2017-06-04 LAB — BACTERIA UR CULT: NORMAL

## 2017-06-08 ENCOUNTER — OFFICE VISIT (OUTPATIENT)
Dept: UROLOGY | Facility: CLINIC | Age: 72
End: 2017-06-08
Payer: MEDICARE

## 2017-06-08 ENCOUNTER — HOSPITAL ENCOUNTER (OUTPATIENT)
Dept: RADIOLOGY | Facility: HOSPITAL | Age: 72
Discharge: HOME | End: 2017-06-08
Attending: UROLOGY
Payer: MEDICARE

## 2017-06-08 VITALS
BODY MASS INDEX: 29.82 KG/M2 | HEART RATE: 72 BPM | WEIGHT: 179 LBS | SYSTOLIC BLOOD PRESSURE: 139 MMHG | DIASTOLIC BLOOD PRESSURE: 79 MMHG | HEIGHT: 65 IN

## 2017-06-08 DIAGNOSIS — N39.0 RECURRENT UTI: ICD-10-CM

## 2017-06-08 DIAGNOSIS — N39.0 RECURRENT UTI: Primary | ICD-10-CM

## 2017-06-08 LAB
BILIRUB SERPL-MCNC: NORMAL MG/DL
BLOOD URINE, POC: NORMAL
COLOR, POC UA: YELLOW
GLUCOSE UR QL STRIP: NORMAL
KETONES UR QL STRIP: NORMAL
LEUKOCYTE ESTERASE URINE, POC: NORMAL
NITRITE, POC UA: NORMAL
PH, POC UA: 5
PROTEIN, POC: NORMAL
SPECIFIC GRAVITY, POC UA: 1.02
UROBILINOGEN, POC UA: NORMAL

## 2017-06-08 PROCEDURE — 1126F AMNT PAIN NOTED NONE PRSNT: CPT | Mod: ,,, | Performed by: UROLOGY

## 2017-06-08 PROCEDURE — 1159F MED LIST DOCD IN RCRD: CPT | Mod: ,,, | Performed by: UROLOGY

## 2017-06-08 PROCEDURE — 99204 OFFICE O/P NEW MOD 45 MIN: CPT | Mod: S$PBB,,, | Performed by: UROLOGY

## 2017-06-08 PROCEDURE — 76770 US EXAM ABDO BACK WALL COMP: CPT | Mod: TC,PO

## 2017-06-08 PROCEDURE — 99213 OFFICE O/P EST LOW 20 MIN: CPT | Mod: PBBFAC,PO | Performed by: UROLOGY

## 2017-06-08 PROCEDURE — 76770 US EXAM ABDO BACK WALL COMP: CPT | Mod: 26,,, | Performed by: RADIOLOGY

## 2017-06-08 PROCEDURE — 81002 URINALYSIS NONAUTO W/O SCOPE: CPT | Mod: PBBFAC,PO | Performed by: UROLOGY

## 2017-06-08 PROCEDURE — 99999 PR PBB SHADOW E&M-EST. PATIENT-LVL III: CPT | Mod: PBBFAC,,, | Performed by: UROLOGY

## 2017-06-08 NOTE — PROGRESS NOTES
UROLOGY [place]  6 8 17    Urinalysis: col yellow, sg 20, pH 5, leuco +, nitrites -, prot trace, glucose -, bili -, blood -    c-c recurrent uti    Age 72, having recurrent uti's, having had 3 this year. The past few years she has not had any uti's, but during the current year she doesn't know why she has had so many of them.     In jan2017 she had a positive culture for E coli, multisensitive; in apr2017 it was also multisensitive E coli, and in jan2017 pansensitive Klebsiella pneumoniae.    When pt is infected she has dysuria, urinary frequency, low volume voidings, urgency, and increased nocturia (x2-3). Normally her nocturia is x0-1.    PMH    Surgical:  has a past surgical history that includes Tubal ligation; Breast biopsy; Vaginal delivery; Back surgery (01/05/2011); Shoulder arthroscopy; Esophagogastroduodenoscopy (11/17/2011); Colonoscopy (11/16/2011); Eye surgery; Foot surgery; Esophagogastroduodenoscopy (1/16/2013,2/29/16); Colonoscopy (1/24/1013); and Hemorrhoid surgery.    Medical:  has a past medical history of Anemia; Blood transfusion; Chronic gastritis; Depression; Diabetes mellitus with stage 3 chronic kidney disease; Diverticulosis; DM type 2 (diabetes mellitus, type 2); GERD (gastroesophageal reflux disease); Glaucoma (increased eye pressure); Hyperlipidemia LDL goal < 100; Hypertension goal BP (blood pressure) < 130/80; and RLS (restless legs syndrome).    Familial: father had long cancer and prostate cancer and mother had heart disease. Has a sister with ovarian cancer and another with diabetes    Social: , retired from accounting, [place]    Current Outpatient Prescriptions on File Prior to Visit   Medication Sig Dispense Refill    cyclobenzaprine (FLEXERIL) 5 MG tablet Take 1 tablet (5 mg total) by mouth 3 (three) times daily as needed for Muscle spasms. 30 tablet 1    docusate calcium (SURFAK) 240 mg capsule Take 240 mg by mouth daily as needed.       gabapentin  (NEURONTIN) 300 MG capsule TAKE ONE CAPSULE BY MOUTH TWICE DAILY 180 capsule 0    glimepiride (AMARYL) 1 MG tablet Take 1 mg by mouth before breakfast. Patient taking 1mg po QD, prn, per self      latanoprost (XALATAN) 0.005 % ophthalmic solution Place 1 drop into both eyes every evening.        lisinopril (PRINIVIL,ZESTRIL) 20 MG tablet TAKE ONE TABLET BY MOUTH EVERY DAY 90 tablet 3    nitrofurantoin, macrocrystal-monohydrate, (MACROBID) 100 MG capsule Take 1 capsule (100 mg total) by mouth 2 (two) times daily. 20 capsule 0    nystatin (MYCOSTATIN) cream Apply topically 2 (two) times daily as needed. 30 g 1    omeprazole (PRILOSEC) 20 MG capsule TAKE TWO CAPSULES BY MOUTH EVERY  capsule 3    ondansetron (ZOFRAN) 4 MG tablet TAKE ONE TABLET BY MOUTH EVERY 8 HOURS AS NEEDED FOR NAUSEA 12 tablet 0    polyethylene glycol (GLYCOLAX) 17 gram/dose powder Take 17 g by mouth once daily. Mix in water or juice 510 g prn    PROCTOZONE-HC 2.5 % rectal cream PLACE RECTALLY TWICE DAILY (Patient taking differently: PLACE RECTALLY TWICE DAILY as needed) 30 g 1    ropinirole (REQUIP) 1 MG tablet TAKE ONE TABLET BY MOUTH NIGHTLY 90 tablet 3    sitagliptan-metformin (JANUMET) 50-1,000 mg per tablet Take 1 tablet by mouth once daily at 6am.       tramadol (ULTRAM) 50 mg tablet Take 1 tablet (50 mg total) by mouth every 8 (eight) hours as needed. 30 tablet 0    triamcinolone acetonide 0.1% (KENALOG) 0.1 % cream Apply topically 2 (two) times daily. (Patient taking differently: Apply topically 2 (two) times daily. As needed) 30 g 1     REVIEW OF SYSTEMS  GENERAL: has a tendency to fatigue. Occasional headaches, no dizzy spells.   HEENT: vision is reduced. Sinuses: has allergies.   CARDIOPULMONARY: No swelling of the legs; no chest pain. No shortness of breath, no wheezing.   GASTROINTESTINAL: has heartburn. Denies diarrhea; occasional constipation, no blood or mucus in stools.   GENITOURINARY: Denies dysuria, unless  infected; no bleeding or incontinence.   MUSCULOSKELETAL: has arthritic complaints especially in the legs   PSYCHIATRIC: No history of depression or anxiety.   ENDOCRINOLOGIC: No reports of sweating, cold or heat intolerance. No polyuria or polydipsia.   NEUROLOGICAL: No headache, dizziness, syncope, paralysis, ataxia, numbness or tingling in the extremities.  LYMPHATICS: No enlarged nodes. No history of splenectomy.    Pt alert, oriented, no distress  HEENT: wnl  Neck: supple, no JVD, no lymphadenopathy  Chest: CV NSR, no murmurs  Lungs: normal auscultation  Abdomen flat, nontender, no organomegaly, no masses.  No hernias  External genitalia nl  Extremities: no edema, peripheral pulses nl  Neuro: preserved    IMP  Recurrent uti  i recommend cystoscopy, upper tract ultrasound, bladderscan  Pt may benefit from prophylactic antibiotics.

## 2017-06-08 NOTE — LETTER
June 8, 2017      [place]  [address]  [place] [address]           [facility]  [address] [facility] [address] [place] [address]  Phone: [phone]          Patient: Eunice Hutson   MR Number: [id number]   YOB: 1945   Date of Visit: 6/8/2017       Dear Penelope Cardoso:    Thank you for referring Eunice Hutson to me for evaluation. Attached you will find relevant portions of my assessment and plan of care.    If you have questions, please do not hesitate to call me. I look forward to following Eunice Hutson along with you.    Sincerely,    Mk Oliver MD    Enclosure  CC:  No Recipients    If you would like to receive this communication electronically, please contact [email or web address] or [phone] to request more information on [employer] access.    For providers and/or their staff who would like to refer a patient to [facility], please contact us through our one-stop-shop provider referral line, [facility] , at [phone].    If you feel you have received this communication in error or would no longer like to receive these types of communications, please e-mail [email or web address]

## 2017-07-05 ENCOUNTER — PROCEDURE VISIT (OUTPATIENT)
Dept: UROLOGY | Facility: CLINIC | Age: 72
End: 2017-07-05
Payer: MEDICARE

## 2017-07-05 VITALS
SYSTOLIC BLOOD PRESSURE: 110 MMHG | WEIGHT: 174.19 LBS | DIASTOLIC BLOOD PRESSURE: 60 MMHG | BODY MASS INDEX: 29.02 KG/M2 | HEIGHT: 65 IN | HEART RATE: 64 BPM

## 2017-07-05 DIAGNOSIS — R31.9 HEMATURIA: Primary | ICD-10-CM

## 2017-07-05 DIAGNOSIS — R33.9 INCOMPLETE BLADDER EMPTYING: ICD-10-CM

## 2017-07-05 DIAGNOSIS — N95.2 ATROPHIC VAGINITIS: ICD-10-CM

## 2017-07-05 DIAGNOSIS — N39.0 RECURRENT UTI: ICD-10-CM

## 2017-07-05 LAB
BILIRUB SERPL-MCNC: NORMAL MG/DL
BLOOD URINE, POC: NORMAL
COLOR, POC UA: YELLOW
GLUCOSE UR QL STRIP: NORMAL
KETONES UR QL STRIP: NORMAL
LEUKOCYTE ESTERASE URINE, POC: NORMAL
NITRITE, POC UA: NORMAL
PH, POC UA: 5
PROTEIN, POC: NORMAL
SPECIFIC GRAVITY, POC UA: 1.01
UROBILINOGEN, POC UA: NORMAL

## 2017-07-05 PROCEDURE — 81002 URINALYSIS NONAUTO W/O SCOPE: CPT | Mod: PBBFAC,PO | Performed by: UROLOGY

## 2017-07-05 PROCEDURE — 52000 CYSTOURETHROSCOPY: CPT | Mod: S$PBB,,, | Performed by: UROLOGY

## 2017-07-05 PROCEDURE — 52000 CYSTOURETHROSCOPY: CPT | Mod: PBBFAC,PO | Performed by: UROLOGY

## 2017-07-05 PROCEDURE — 51798 US URINE CAPACITY MEASURE: CPT | Mod: PBBFAC,PO | Performed by: UROLOGY

## 2017-07-05 RX ORDER — CIPROFLOXACIN 500 MG/1
500 TABLET ORAL 2 TIMES DAILY
Qty: 30 TABLET | Refills: 0 | Status: SHIPPED | OUTPATIENT
Start: 2017-07-05 | End: 2017-07-06

## 2017-07-05 NOTE — PROGRESS NOTES
UROLOGY [place]  7 5 17       Age 72, having recurrent uti's, having had 3 this year. Comes in for urologic w/u    abd soft and nontender. CVAs neg  Vulva pale and thin. Vagina narrow and tender. No pelvic masses    CYSTOSCOPY olympus flexible. Urethra small, no stricture or diverticulum. Bladder cavity distends symmetrically and equally when distended with water. Mucosal layer with no lesions. No abnormal trabeculation. Location and shape of the ureteral orifices normal. Pt tolerated the procedure well.     BLADDERSCAN ULTRASOUND  0  ml residual    RENAL ULTRASOUND 6 8 17  Findings: Renal ultrasound performed.  The kidneys are normal in length measuring 11.4 cm on the right and 10.5 cm on the left.  There is normal cortical medullary differentiation and cortical thickness. 2 simple cyst involving the right kidney measuring up to 2.2 cm at the interpolar region and 12 mm at the upper pole. No hydronephrosis or renal masses identified.  The bladder is grossly unremarkable.     small simple right renal cysts as above.  No hydronephrosis.          IMP  Recurrent uti  Atrophic vaginitis  Pt may benefit from prophylactic antibiotics. Pt prefers to have cipro antibiotic close at hand to use as needed.   Also she may benefit from the use of premarin cream, I am sending to pharmacy

## 2017-07-06 ENCOUNTER — TELEPHONE (OUTPATIENT)
Dept: FAMILY MEDICINE | Facility: CLINIC | Age: 72
End: 2017-07-06

## 2017-07-06 ENCOUNTER — LAB VISIT (OUTPATIENT)
Dept: LAB | Facility: HOSPITAL | Age: 72
End: 2017-07-06
Attending: FAMILY MEDICINE
Payer: MEDICARE

## 2017-07-06 ENCOUNTER — OFFICE VISIT (OUTPATIENT)
Dept: FAMILY MEDICINE | Facility: CLINIC | Age: 72
End: 2017-07-06
Payer: MEDICARE

## 2017-07-06 VITALS
WEIGHT: 171.94 LBS | HEART RATE: 76 BPM | DIASTOLIC BLOOD PRESSURE: 71 MMHG | BODY MASS INDEX: 28.65 KG/M2 | HEIGHT: 65 IN | SYSTOLIC BLOOD PRESSURE: 112 MMHG

## 2017-07-06 DIAGNOSIS — E11.69 COMBINED HYPERLIPIDEMIA ASSOCIATED WITH TYPE 2 DIABETES MELLITUS: ICD-10-CM

## 2017-07-06 DIAGNOSIS — E78.5 HYPERLIPIDEMIA, UNSPECIFIED HYPERLIPIDEMIA TYPE: ICD-10-CM

## 2017-07-06 DIAGNOSIS — E78.2 COMBINED HYPERLIPIDEMIA ASSOCIATED WITH TYPE 2 DIABETES MELLITUS: ICD-10-CM

## 2017-07-06 DIAGNOSIS — E11.22 DIABETES MELLITUS WITH STAGE 3 CHRONIC KIDNEY DISEASE: ICD-10-CM

## 2017-07-06 DIAGNOSIS — E11.59 HYPERTENSION ASSOCIATED WITH DIABETES: ICD-10-CM

## 2017-07-06 DIAGNOSIS — R53.83 FATIGUE, UNSPECIFIED TYPE: ICD-10-CM

## 2017-07-06 DIAGNOSIS — I15.2 HYPERTENSION ASSOCIATED WITH DIABETES: ICD-10-CM

## 2017-07-06 DIAGNOSIS — R07.9 CHEST PAIN, UNSPECIFIED TYPE: Primary | ICD-10-CM

## 2017-07-06 DIAGNOSIS — R51.9 NONINTRACTABLE HEADACHE, UNSPECIFIED CHRONICITY PATTERN, UNSPECIFIED HEADACHE TYPE: ICD-10-CM

## 2017-07-06 DIAGNOSIS — G89.29 CHRONIC LOW BACK PAIN WITHOUT SCIATICA, UNSPECIFIED BACK PAIN LATERALITY: ICD-10-CM

## 2017-07-06 DIAGNOSIS — R68.89 FORGETFULNESS: ICD-10-CM

## 2017-07-06 DIAGNOSIS — E11.9 TYPE 2 DIABETES MELLITUS WITHOUT COMPLICATION, WITHOUT LONG-TERM CURRENT USE OF INSULIN: ICD-10-CM

## 2017-07-06 DIAGNOSIS — N18.30 DIABETES MELLITUS WITH STAGE 3 CHRONIC KIDNEY DISEASE: ICD-10-CM

## 2017-07-06 DIAGNOSIS — I10 ESSENTIAL HYPERTENSION: ICD-10-CM

## 2017-07-06 DIAGNOSIS — M54.50 CHRONIC LOW BACK PAIN WITHOUT SCIATICA, UNSPECIFIED BACK PAIN LATERALITY: ICD-10-CM

## 2017-07-06 LAB
ALT SERPL W/O P-5'-P-CCNC: 9 U/L
ANION GAP SERPL CALC-SCNC: 7 MMOL/L
BUN SERPL-MCNC: 23 MG/DL
CALCIUM SERPL-MCNC: 9.6 MG/DL
CHLORIDE SERPL-SCNC: 106 MMOL/L
CHOLEST/HDLC SERPL: 3.6 {RATIO}
CO2 SERPL-SCNC: 29 MMOL/L
CREAT SERPL-MCNC: 1.3 MG/DL
EST. GFR  (AFRICAN AMERICAN): 48 ML/MIN/1.73 M^2
EST. GFR  (NON AFRICAN AMERICAN): 41.7 ML/MIN/1.73 M^2
GLUCOSE SERPL-MCNC: 91 MG/DL
HDL/CHOLESTEROL RATIO: 27.7 %
HDLC SERPL-MCNC: 253 MG/DL
HDLC SERPL-MCNC: 70 MG/DL
LDLC SERPL CALC-MCNC: 157.4 MG/DL
NONHDLC SERPL-MCNC: 183 MG/DL
POTASSIUM SERPL-SCNC: 4.4 MMOL/L
SODIUM SERPL-SCNC: 142 MMOL/L
TRIGL SERPL-MCNC: 128 MG/DL

## 2017-07-06 PROCEDURE — 1159F MED LIST DOCD IN RCRD: CPT | Mod: ,,, | Performed by: FAMILY MEDICINE

## 2017-07-06 PROCEDURE — 99214 OFFICE O/P EST MOD 30 MIN: CPT | Mod: S$PBB,,, | Performed by: FAMILY MEDICINE

## 2017-07-06 PROCEDURE — 99999 PR PBB SHADOW E&M-EST. PATIENT-LVL III: CPT | Mod: PBBFAC,,, | Performed by: FAMILY MEDICINE

## 2017-07-06 PROCEDURE — 93010 ELECTROCARDIOGRAM REPORT: CPT | Mod: ,,, | Performed by: INTERNAL MEDICINE

## 2017-07-06 PROCEDURE — 3044F HG A1C LEVEL LT 7.0%: CPT | Mod: ,,, | Performed by: FAMILY MEDICINE

## 2017-07-06 PROCEDURE — 4010F ACE/ARB THERAPY RXD/TAKEN: CPT | Mod: ,,, | Performed by: FAMILY MEDICINE

## 2017-07-06 PROCEDURE — 99213 OFFICE O/P EST LOW 20 MIN: CPT | Mod: PBBFAC,PO | Performed by: FAMILY MEDICINE

## 2017-07-06 PROCEDURE — 93005 ELECTROCARDIOGRAM TRACING: CPT | Mod: PBBFAC,PO | Performed by: FAMILY MEDICINE

## 2017-07-06 RX ORDER — ONDANSETRON 4 MG/1
4 TABLET, FILM COATED ORAL EVERY 8 HOURS PRN
Qty: 12 TABLET | Refills: 0 | Status: SHIPPED | OUTPATIENT
Start: 2017-07-06 | End: 2018-01-15 | Stop reason: SDUPTHER

## 2017-07-06 RX ORDER — CYCLOBENZAPRINE HCL 5 MG
5 TABLET ORAL 3 TIMES DAILY PRN
Qty: 30 TABLET | Refills: 0 | Status: SHIPPED | OUTPATIENT
Start: 2017-07-06 | End: 2019-03-14

## 2017-07-06 RX ORDER — TRAMADOL HYDROCHLORIDE 50 MG/1
50 TABLET ORAL EVERY 8 HOURS PRN
Qty: 20 TABLET | Refills: 0 | Status: SHIPPED | OUTPATIENT
Start: 2017-07-06 | End: 2017-10-03 | Stop reason: SDUPTHER

## 2017-07-06 NOTE — TELEPHONE ENCOUNTER
----- Message from Netta Tatum sent at 7/6/2017 10:27 AM CDT -----  Called to provide name whitley dennisjennifer from previous conversation. Please call back at [phone]. thanks

## 2017-07-06 NOTE — PROGRESS NOTES
Complains of chest pain for 2-3 months.  Symptoms occurring 2-3 times per week lasting 5-10 minutes.  Not exertional.  Feels like indigestion or pressure.  Occasionally notices some reflux.    Appears stable.  Concerned about her heart.  Occasionally gets some dizziness ill-defined.  No vertigo.  Concerned about her memory.  Occasionally feels like she forgets things.  She did fall down a couple of times this year not sure why she fell.  She has had some occasional headaches during the past few months.  Bilateral.  Feels agitated with these.  Last about an hour.  Possibly some nausea.  She's had previous history of similar headaches.  She does have some stress related to family illnesses.  Chronic back pains.  Occasional tramadol and Flexeril use.  Past Medical History:  Past Medical History:   Diagnosis Date    Anemia     Blood transfusion     Chronic gastritis     Depression     Diabetes mellitus with stage 3 chronic kidney disease     Diverticulosis     DM type 2 (diabetes mellitus, type 2) 2/27/2012    GERD (gastroesophageal reflux disease)     Glaucoma (increased eye pressure)     Hyperlipidemia LDL goal < 100 2/27/2012    Hypertension goal BP (blood pressure) < 130/80 2/27/2012    RLS (restless legs syndrome)      Past Surgical History:   Procedure Laterality Date    BACK SURGERY  01/05/2011    BREAST BIOPSY      benign    COLONOSCOPY  11/16/2011    COLONOSCOPY  1/24/1013    ESOPHAGOGASTRODUODENOSCOPY  11/17/2011    ESOPHAGOGASTRODUODENOSCOPY  1/16/2013,2/29/16    EYE SURGERY      FOOT SURGERY       derotational arthroplasty of the fifth digit right foot     HEMORRHOID SURGERY      SHOULDER ARTHROSCOPY      TUBAL LIGATION      VAGINAL DELIVERY      times 2     Social History     Social History    Marital status:      Spouse name: N/A    Number of children: N/A    Years of education: N/A     Occupational History    retired accounting      Social History Main Topics    Smoking  status: Never Smoker    Smokeless tobacco: Never Used    Alcohol use No    Drug use: No    Sexual activity: No     Other Topics Concern    Not on file     Social History Narrative    No narrative on file     Family History   Problem Relation Age of Onset    Heart disease Mother     Prostate cancer Father     Lung cancer Father     Ovarian cancer Sister     Colon cancer Brother 43    Cancer Brother      Colon    Kidney disease Brother      Kidney transplant    Diabetes Sister      Review of patient's allergies indicates:   Allergen Reactions    Bactrim [sulfamethoxazole-trimethoprim]     Caffeine Other (See Comments)     Makes hyper    Codeine      Other reaction(s): Rash  Able to take Lortab    Lipitor [atorvastatin] Other (See Comments)     Leg cramps    Lyrica [pregabalin] Hallucinations    Pravastatin Other (See Comments)     Leg cramps     Current Outpatient Prescriptions on File Prior to Visit   Medication Sig Dispense Refill    docusate calcium (SURFAK) 240 mg capsule Take 240 mg by mouth daily as needed.       gabapentin (NEURONTIN) 300 MG capsule TAKE ONE CAPSULE BY MOUTH TWICE DAILY 180 capsule 0    glimepiride (AMARYL) 1 MG tablet Take 1 mg by mouth before breakfast. Patient taking 1mg po QD, prn, per self      latanoprost (XALATAN) 0.005 % ophthalmic solution Place 1 drop into both eyes every evening.        lisinopril (PRINIVIL,ZESTRIL) 20 MG tablet TAKE ONE TABLET BY MOUTH EVERY DAY 90 tablet 3    nystatin (MYCOSTATIN) cream Apply topically 2 (two) times daily as needed. 30 g 1    omeprazole (PRILOSEC) 20 MG capsule TAKE TWO CAPSULES BY MOUTH EVERY  capsule 3    polyethylene glycol (GLYCOLAX) 17 gram/dose powder Take 17 g by mouth once daily. Mix in water or juice 510 g prn    PROCTOZONE-HC 2.5 % rectal cream PLACE RECTALLY TWICE DAILY (Patient taking differently: PLACE RECTALLY TWICE DAILY as needed) 30 g 1    ropinirole (REQUIP) 1 MG tablet TAKE ONE TABLET BY MOUTH  "NIGHTLY 90 tablet 3    sitagliptan-metformin (JANUMET) 50-1,000 mg per tablet Take 1 tablet by mouth once daily at 6am.       triamcinolone acetonide 0.1% (KENALOG) 0.1 % cream Apply topically 2 (two) times daily. (Patient taking differently: Apply topically 2 (two) times daily. As needed) 30 g 1    [DISCONTINUED] cyclobenzaprine (FLEXERIL) 5 MG tablet Take 1 tablet (5 mg total) by mouth 3 (three) times daily as needed for Muscle spasms. 30 tablet 1    [DISCONTINUED] ondansetron (ZOFRAN) 4 MG tablet TAKE ONE TABLET BY MOUTH EVERY 8 HOURS AS NEEDED FOR NAUSEA 12 tablet 0    [DISCONTINUED] tramadol (ULTRAM) 50 mg tablet Take 1 tablet (50 mg total) by mouth every 8 (eight) hours as needed. 30 tablet 0    conjugated estrogens (PREMARIN) vaginal cream Place 1 g vaginally 3 (three) times a week. 30 g 5    [DISCONTINUED] ciprofloxacin HCl (CIPRO) 500 MG tablet Take 1 tablet (500 mg total) by mouth 2 (two) times daily. 30 tablet 0     No current facility-administered medications on file prior to visit.            ROS:  GENERAL: No fever, chills,  or significant weight changes.   CARDIOVASCULAR: Denies chest pain, PND, orthopnea or reduced exercise tolerance.  ABDOMEN: Appetite fine. Denies diarrhea, abdominal pain, hematemesis or blood in stool.  URINARY: No flank pain, dysuria or hematuria.      OBJECTIVE:     Vitals:    07/06/17 0834   BP: 112/71   Pulse: 76   Weight: 78 kg (171 lb 15.3 oz)   Height: 5' 5" (1.651 m)     Wt Readings from Last 3 Encounters:   07/06/17 78 kg (171 lb 15.3 oz)   07/05/17 79 kg (174 lb 2.6 oz)   06/08/17 81.2 kg (179 lb 0.2 oz)     APPEARANCE: Well nourished, well developed, in no acute distress.    HEAD: Normocephalic.  Atraumatic.  No sinus tenderness.  EYES:   Right eye: Pupil reactive.  Conjunctiva clear.    Left eye: Pupil reactive.  Conjunctiva clear.    Both fundi:  Grossly normal to nondilated exam. EOMI.    EARS: TM's intact. Light reflex normal. No retraction or perforation.  "   NOSE:  clear.  MOUTH & THROAT:  No pharyngeal erythema or exudate. No lesions.  NECK: Supple. No bruits.  No JVD.  No cervical lymphadenopathy.  No thyromegaly.    CHEST: Breath sounds clear bilaterally.  Normal respiratory effort  CARDIOVASCULAR: Normal rate.  Regular rhythm.  No murmurs.  No rub.  No gallops.  ABDOMEN: Bowel sounds normal.  Soft.  No tenderness.  No organomegaly.  PERIPHERAL VASCULAR: No cyanosis.  No clubbing.  No edema.  NEUROLOGIC: Cranial nerves two through 12 are intact. Motor strength is normal in the upper and lower extremities proximally and distally.  No pronator drift.  Deep tendon reflexes are intact. Sensation intact. Gait is normal. Tandem gait is normal. Negative Romberg. Rapid alternating movements and finger-to-nose movements are normal.  Protective Sensation (w/ 10 gram monofilament):  Right: Intact  Left: Intact    Visual Inspection:  Normal -  Bilateral    Pedal Pulses:   Right: Present  Left: Present    Posterior tibialis:   Right:Present  Left: Present     Mini-Mental Status exam 29/30  MENTAL STATUS: Alert.  Oriented x 3Bhavesh Dawson was seen today for headache, memory problem and chest pain.    Diagnoses and all orders for this visit:    Chest pain, unspecified type  -     Ambulatory referral to Cardiology  -     EKG 12-lead    Nonintractable headache, unspecified chronicity pattern, unspecified headache type    Fatigue, unspecified type    Forgetfulness    Diabetes mellitus with stage 3 chronic kidney disease  -     Ambulatory referral to Optometry    Hypertension associated with diabetes    Combined hyperlipidemia associated with type 2 diabetes mellitus    Chronic low back pain without sciatica, unspecified back pain laterality    Other orders  -     cyclobenzaprine (FLEXERIL) 5 MG tablet; Take 1 tablet (5 mg total) by mouth 3 (three) times daily as needed for Muscle spasms.  -     tramadol (ULTRAM) 50 mg tablet; Take 1 tablet (50 mg total) by mouth every 8 (eight)  hours as needed.  -     ondansetron (ZOFRAN) 4 MG tablet; Take 1 tablet (4 mg total) by mouth every 8 (eight) hours as needed. AS NEEDED FOR NAUSEA     follow-up 1 month

## 2017-07-07 LAB
ESTIMATED AVG GLUCOSE: 148 MG/DL
HBA1C MFR BLD HPLC: 6.8 %

## 2017-07-13 ENCOUNTER — OFFICE VISIT (OUTPATIENT)
Dept: CARDIOLOGY | Facility: CLINIC | Age: 72
End: 2017-07-13
Payer: MEDICARE

## 2017-07-13 VITALS
BODY MASS INDEX: 29.49 KG/M2 | SYSTOLIC BLOOD PRESSURE: 130 MMHG | HEART RATE: 100 BPM | WEIGHT: 177 LBS | HEIGHT: 65 IN | DIASTOLIC BLOOD PRESSURE: 82 MMHG

## 2017-07-13 DIAGNOSIS — I15.2 HYPERTENSION ASSOCIATED WITH DIABETES: Primary | ICD-10-CM

## 2017-07-13 DIAGNOSIS — E11.69 COMBINED HYPERLIPIDEMIA ASSOCIATED WITH TYPE 2 DIABETES MELLITUS: ICD-10-CM

## 2017-07-13 DIAGNOSIS — E78.2 COMBINED HYPERLIPIDEMIA ASSOCIATED WITH TYPE 2 DIABETES MELLITUS: ICD-10-CM

## 2017-07-13 DIAGNOSIS — E78.2 MIXED HYPERLIPIDEMIA: ICD-10-CM

## 2017-07-13 DIAGNOSIS — E11.59 HYPERTENSION ASSOCIATED WITH DIABETES: Primary | ICD-10-CM

## 2017-07-13 DIAGNOSIS — G62.9 NEUROPATHY: ICD-10-CM

## 2017-07-13 DIAGNOSIS — R06.09 DOE (DYSPNEA ON EXERTION): ICD-10-CM

## 2017-07-13 DIAGNOSIS — R07.9 CHEST PAIN SYNDROME: ICD-10-CM

## 2017-07-13 PROCEDURE — 99999 PR PBB SHADOW E&M-EST. PATIENT-LVL III: CPT | Mod: PBBFAC,,, | Performed by: INTERNAL MEDICINE

## 2017-07-13 PROCEDURE — 99213 OFFICE O/P EST LOW 20 MIN: CPT | Mod: PBBFAC,PO | Performed by: INTERNAL MEDICINE

## 2017-07-13 PROCEDURE — 4010F ACE/ARB THERAPY RXD/TAKEN: CPT | Mod: ,,, | Performed by: INTERNAL MEDICINE

## 2017-07-13 PROCEDURE — 1159F MED LIST DOCD IN RCRD: CPT | Mod: ,,, | Performed by: INTERNAL MEDICINE

## 2017-07-13 PROCEDURE — 99204 OFFICE O/P NEW MOD 45 MIN: CPT | Mod: S$PBB,,, | Performed by: INTERNAL MEDICINE

## 2017-07-13 PROCEDURE — 3044F HG A1C LEVEL LT 7.0%: CPT | Mod: ,,, | Performed by: INTERNAL MEDICINE

## 2017-07-13 PROCEDURE — 1126F AMNT PAIN NOTED NONE PRSNT: CPT | Mod: ,,, | Performed by: INTERNAL MEDICINE

## 2017-07-13 NOTE — PROGRESS NOTES
Subjective:    Patient ID:  Eunice Hutson is a 72 y.o. female who presents for evaluation of new pt (new pt); ref from Dr Sahni; Chest Pain; Shortness of Breath; and Fatigue      HPI72 yo BF with HTN , DM, and HLD that is not treated because of intolerance to all statins referred for SOB and CP with exertion. Patient also fatigued. States symptoms have been occurring for several months. Baseline EKG normal. Denies palpitations, weak spells, and syncope    Review of Systems   Constitution: Negative for decreased appetite, fever, weakness, malaise/fatigue, weight gain and weight loss.   HENT: Positive for headaches. Negative for hearing loss and nosebleeds.    Eyes: Negative for visual disturbance.   Cardiovascular: Positive for chest pain and dyspnea on exertion. Negative for claudication, cyanosis, irregular heartbeat, leg swelling, near-syncope, orthopnea, palpitations, paroxysmal nocturnal dyspnea and syncope.   Respiratory: Positive for shortness of breath. Negative for cough, hemoptysis, sleep disturbances due to breathing, snoring and wheezing.    Endocrine: Negative for cold intolerance, heat intolerance, polydipsia and polyuria.   Hematologic/Lymphatic: Negative for adenopathy and bleeding problem. Does not bruise/bleed easily.   Skin: Negative for color change, itching, poor wound healing, rash and suspicious lesions.   Musculoskeletal: Negative for arthritis, back pain, falls, joint pain, joint swelling, muscle cramps, muscle weakness and myalgias.   Gastrointestinal: Negative for bloating, abdominal pain, change in bowel habit, constipation, flatus, heartburn, hematemesis, hematochezia, hemorrhoids, jaundice, melena, nausea and vomiting.   Genitourinary: Negative for bladder incontinence, decreased libido, frequency, hematuria, hesitancy and urgency.   Neurological: Positive for dizziness and numbness. Negative for brief paralysis, difficulty with concentration, excessive daytime sleepiness, focal  "weakness, light-headedness, loss of balance and vertigo.   Psychiatric/Behavioral: Negative for altered mental status, depression and memory loss. The patient does not have insomnia and is not nervous/anxious.    Allergic/Immunologic: Negative for environmental allergies, hives and persistent infections.        Objective:    Physical Exam   Constitutional: She is oriented to person, place, and time. She appears well-developed and well-nourished.   /82   Pulse 100   Ht 5' 5" (1.651 m)   Wt 80.3 kg (177 lb)   BMI 29.45 kg/m²      HENT:   Head: Normocephalic and atraumatic.   Right Ear: External ear normal.   Left Ear: External ear normal.   Nose: Nose normal.   Mouth/Throat: Oropharynx is clear and moist.   Eyes: Conjunctivae, EOM and lids are normal. Pupils are equal, round, and reactive to light. Right eye exhibits no discharge. Left eye exhibits no discharge. Right conjunctiva has no hemorrhage. No scleral icterus.   Neck: Normal range of motion. Neck supple. No JVD present. No tracheal deviation present. No thyromegaly present.   Cardiovascular: Normal rate, regular rhythm, normal heart sounds and intact distal pulses.  Exam reveals no gallop and no friction rub.    No murmur heard.  Pulmonary/Chest: Effort normal and breath sounds normal. No respiratory distress. She has no wheezes. She has no rales. She exhibits no tenderness. Breasts are symmetrical.   Abdominal: Soft. Bowel sounds are normal. She exhibits no distension and no mass. There is no hepatosplenomegaly or hepatomegaly. There is no tenderness. There is no rebound and no guarding.   Musculoskeletal: Normal range of motion. She exhibits no edema or tenderness.   Lymphadenopathy:     She has no cervical adenopathy.   Neurological: She is alert and oriented to person, place, and time. She displays normal reflexes. No cranial nerve deficit. Coordination normal.   Skin: Skin is warm and dry. No rash noted. No erythema. No pallor.   Psychiatric: " She has a normal mood and affect. Her behavior is normal. Judgment and thought content normal.   Nursing note and vitals reviewed.        Assessment:       1. Hypertension associated with diabetes    2. Combined hyperlipidemia associated with type 2 diabetes mellitus    3. Chest pain syndrome    4. VARELA (dyspnea on exertion)    5. Neuropathy    6. Mixed hyperlipidemia         Plan:     Multiple risk factors for CAD       Orders Placed This Encounter   Procedures    NM Myocardial Perfusion Spect Multi Pharmacologic    NM Multi Pharm Stress Cardiac Component    2D echo with color flow doppler   Depending on above further recommendations  Return in about 6 months (around 1/13/2018).

## 2017-07-13 NOTE — LETTER
July 13, 2017      Parminder Sahni MD  [address] [facility] [id number] [place] [address] Cardiology  [address] [facility] [address]  Phone: [phone]  Fax: [phone]          Patient: Eunice Hutson   MR Number: [id number]   YOB: 1945   Date of Visit: 7/13/2017       Dear Dr. Parminder Sahni:    Thank you for referring Eunice Hutson to me for evaluation. Attached you will find relevant portions of my assessment and plan of care.    If you have questions, please do not hesitate to call me. I look forward to following Eunice Hutson along with you.    Sincerely,    Abdelrhaman Lowe Jr., MD    Enclosure  CC:  No Recipients    If you would like to receive this communication electronically, please contact [email or web address] or [phone] to request more information on [facility] access.    For providers and/or their staff who would like to refer a patient to [facility], please contact us through our one-stop-shop provider referral line, [facility], at [phone].    If you feel you have received this communication in error or would no longer like to receive these types of communications, please e-mail [email or web address]

## 2017-07-24 ENCOUNTER — PATIENT OUTREACH (OUTPATIENT)
Dept: ADMINISTRATIVE | Facility: HOSPITAL | Age: 72
End: 2017-07-24

## 2017-07-24 ENCOUNTER — TELEPHONE (OUTPATIENT)
Dept: FAMILY MEDICINE | Facility: CLINIC | Age: 72
End: 2017-07-24

## 2017-07-24 DIAGNOSIS — E11.9 TYPE 2 DIABETES MELLITUS WITHOUT COMPLICATION, WITHOUT LONG-TERM CURRENT USE OF INSULIN: Primary | ICD-10-CM

## 2017-07-24 NOTE — TELEPHONE ENCOUNTER
----- Message from Parminder Sahni MD sent at 7/24/2017 10:37 AM CDT -----  Cholesterol high, otherwise lab work stable.  Patient was not able to tolerate statins.  Recommend continue current medication.  Repeat BMP, hemoglobin A1c first week of January. My nurse will contact you to arrange.  Thanks,  Dr. Sahni    Results released on [facility]

## 2017-08-01 ENCOUNTER — PATIENT OUTREACH (OUTPATIENT)
Dept: ADMINISTRATIVE | Facility: HOSPITAL | Age: 72
End: 2017-08-01

## 2017-08-01 NOTE — LETTER
August 1, 2017    Eunice Hutson  [phone]  [facility] [address]           [facility]  [address]  [facility] [address]  Phone: [phone] Dear Mrs. Hutson:    [facility] is committed to your overall health.  To help you get the most out of each of your visits, we will review your information to make sure you are up to date on all of your recommended tests and/or procedures.      Parminder Sahni MD has found that you may be due for   Health Maintenance Due   Topic    Zoster Vaccine     DEXA SCAN     Influenza Vaccine     Eye Exam      If you have had any of the above done at another facility, please bring the records or information with you so that your record at [facility] will be complete.    If you are currently taking medication, please bring it with you to your appointment for review.    We will be happy to assist you with scheduling any necessary appointments or you may contact the [facility] appointment desk at [phone] to schedule at your convenience.     This information was sent to you via your patient portal.  Please check your message portal for important information.      Thank you for choosing [facility] for your healthcare needs,    If you have any questions or concerns, please don't hesitate to call.    Sincerely,    Fabiana KARIMI LPN  Care Coordination Department  [facility]

## 2017-08-07 ENCOUNTER — CLINICAL SUPPORT (OUTPATIENT)
Dept: CARDIOLOGY | Facility: CLINIC | Age: 72
End: 2017-08-07
Payer: MEDICARE

## 2017-08-07 ENCOUNTER — OFFICE VISIT (OUTPATIENT)
Dept: FAMILY MEDICINE | Facility: CLINIC | Age: 72
End: 2017-08-07
Payer: MEDICARE

## 2017-08-07 VITALS
BODY MASS INDEX: 29.77 KG/M2 | TEMPERATURE: 98 F | DIASTOLIC BLOOD PRESSURE: 71 MMHG | SYSTOLIC BLOOD PRESSURE: 126 MMHG | WEIGHT: 178.69 LBS | HEIGHT: 65 IN | HEART RATE: 103 BPM

## 2017-08-07 DIAGNOSIS — R06.09 DOE (DYSPNEA ON EXERTION): ICD-10-CM

## 2017-08-07 DIAGNOSIS — I15.2 HYPERTENSION ASSOCIATED WITH DIABETES: ICD-10-CM

## 2017-08-07 DIAGNOSIS — E11.59 HYPERTENSION ASSOCIATED WITH DIABETES: ICD-10-CM

## 2017-08-07 DIAGNOSIS — E11.69 COMBINED HYPERLIPIDEMIA ASSOCIATED WITH TYPE 2 DIABETES MELLITUS: ICD-10-CM

## 2017-08-07 DIAGNOSIS — R07.9 CHEST PAIN SYNDROME: ICD-10-CM

## 2017-08-07 DIAGNOSIS — G62.9 NEUROPATHY: ICD-10-CM

## 2017-08-07 DIAGNOSIS — E78.2 MIXED HYPERLIPIDEMIA: ICD-10-CM

## 2017-08-07 DIAGNOSIS — R07.9 CHEST PAIN SYNDROME: Primary | ICD-10-CM

## 2017-08-07 DIAGNOSIS — E78.2 COMBINED HYPERLIPIDEMIA ASSOCIATED WITH TYPE 2 DIABETES MELLITUS: ICD-10-CM

## 2017-08-07 LAB
DIASTOLIC DYSFUNCTION: NO
ESTIMATED PA SYSTOLIC PRESSURE: 23.25
RETIRED EF AND QEF - SEE NOTES: 60 (ref 55–65)

## 2017-08-07 PROCEDURE — 99213 OFFICE O/P EST LOW 20 MIN: CPT | Mod: S$PBB,,, | Performed by: FAMILY MEDICINE

## 2017-08-07 PROCEDURE — 3044F HG A1C LEVEL LT 7.0%: CPT | Mod: ,,, | Performed by: FAMILY MEDICINE

## 2017-08-07 PROCEDURE — 99999 PR PBB SHADOW E&M-EST. PATIENT-LVL III: CPT | Mod: PBBFAC,,, | Performed by: FAMILY MEDICINE

## 2017-08-07 PROCEDURE — 93306 TTE W/DOPPLER COMPLETE: CPT | Mod: PBBFAC,PO | Performed by: NUCLEAR MEDICINE

## 2017-08-07 PROCEDURE — 3008F BODY MASS INDEX DOCD: CPT | Mod: ,,, | Performed by: FAMILY MEDICINE

## 2017-08-07 PROCEDURE — 4010F ACE/ARB THERAPY RXD/TAKEN: CPT | Mod: ,,, | Performed by: FAMILY MEDICINE

## 2017-08-07 PROCEDURE — 1159F MED LIST DOCD IN RCRD: CPT | Mod: ,,, | Performed by: FAMILY MEDICINE

## 2017-08-07 NOTE — PROGRESS NOTES
Patient presents for follow-up.  The chest pain she was having last visit has decreased, though she occasionally still gets some symptoms per she's pending nuclear stress testing through cardiology.  No prolonged symptoms.  The headaches she was having have also decreased significantly.  Overall generally feeling better than last visit.  Her diabetes is controlled.  Blood pressure controlled.  Lipids elevated, but statin intolerant.  Lab Results   Component Value Date    HGBA1C 6.8 (H) 07/06/2017    HGBA1C 6.8 (H) 01/11/2017    HGBA1C 7.5 (H) 10/10/2016     Lab Results   Component Value Date    LDLCALC 157.4 07/06/2017    CREATININE 1.3 07/06/2017       Past Medical History:  Past Medical History:   Diagnosis Date    Anemia     Blood transfusion     Chronic gastritis     Depression     Diabetes mellitus with stage 3 chronic kidney disease     Diverticulosis     DM type 2 (diabetes mellitus, type 2) 2/27/2012    GERD (gastroesophageal reflux disease)     Glaucoma (increased eye pressure)     Hyperlipidemia LDL goal < 100 2/27/2012    Hypertension goal BP (blood pressure) < 130/80 2/27/2012    RLS (restless legs syndrome)      Past Surgical History:   Procedure Laterality Date    BACK SURGERY  01/05/2011    BREAST BIOPSY      benign    COLONOSCOPY  11/16/2011    COLONOSCOPY  1/24/1013    ESOPHAGOGASTRODUODENOSCOPY  11/17/2011    ESOPHAGOGASTRODUODENOSCOPY  1/16/2013,2/29/16    EYE SURGERY      FOOT SURGERY       derotational arthroplasty of the fifth digit right foot     HEMORRHOID SURGERY      SHOULDER ARTHROSCOPY      TUBAL LIGATION      VAGINAL DELIVERY      times 2     Social History     Social History    Marital status:      Spouse name: N/A    Number of children: N/A    Years of education: N/A     Occupational History    retired accounting      Social History Main Topics    Smoking status: Never Smoker    Smokeless tobacco: Never Used    Alcohol use No    Drug use: No     Sexual activity: No     Other Topics Concern    Not on file     Social History Narrative    No narrative on file     Family History   Problem Relation Age of Onset    Heart disease Mother     Prostate cancer Father     Lung cancer Father     Ovarian cancer Sister     Colon cancer Brother 43    Cancer Brother      Colon    Kidney disease Brother      Kidney transplant    Diabetes Sister      Review of patient's allergies indicates:   Allergen Reactions    Bactrim [sulfamethoxazole-trimethoprim]     Caffeine Other (See Comments)     Makes hyper    Codeine      Other reaction(s): Rash  Able to take Lortab    Lipitor [atorvastatin] Other (See Comments)     Leg cramps    Lyrica [pregabalin] Hallucinations    Pravastatin Other (See Comments)     Leg cramps     Current Outpatient Prescriptions on File Prior to Visit   Medication Sig Dispense Refill    conjugated estrogens (PREMARIN) vaginal cream Place 1 g vaginally 3 (three) times a week. 30 g 5    cyclobenzaprine (FLEXERIL) 5 MG tablet Take 1 tablet (5 mg total) by mouth 3 (three) times daily as needed for Muscle spasms. 30 tablet 0    docusate calcium (SURFAK) 240 mg capsule Take 240 mg by mouth daily as needed.       gabapentin (NEURONTIN) 300 MG capsule TAKE ONE CAPSULE BY MOUTH TWICE DAILY 180 capsule 0    glimepiride (AMARYL) 1 MG tablet Take 1 mg by mouth before breakfast. Patient taking 1mg po QD, prn, per self      latanoprost (XALATAN) 0.005 % ophthalmic solution Place 1 drop into both eyes every evening.        lisinopril (PRINIVIL,ZESTRIL) 20 MG tablet TAKE ONE TABLET BY MOUTH EVERY DAY 90 tablet 3    omeprazole (PRILOSEC) 20 MG capsule TAKE TWO CAPSULES BY MOUTH EVERY  capsule 3    ondansetron (ZOFRAN) 4 MG tablet Take 1 tablet (4 mg total) by mouth every 8 (eight) hours as needed. AS NEEDED FOR NAUSEA 12 tablet 0    polyethylene glycol (GLYCOLAX) 17 gram/dose powder Take 17 g by mouth once daily. Mix in water or juice 510 g  "prn    PROCTOZONE-HC 2.5 % rectal cream PLACE RECTALLY TWICE DAILY (Patient taking differently: PLACE RECTALLY TWICE DAILY as needed) 30 g 1    ropinirole (REQUIP) 1 MG tablet TAKE ONE TABLET BY MOUTH NIGHTLY 90 tablet 3    sitagliptan-metformin (JANUMET) 50-1,000 mg per tablet Take 1 tablet by mouth once daily at 6am.       tramadol (ULTRAM) 50 mg tablet Take 1 tablet (50 mg total) by mouth every 8 (eight) hours as needed. 20 tablet 0    triamcinolone acetonide 0.1% (KENALOG) 0.1 % cream Apply topically 2 (two) times daily. (Patient taking differently: Apply topically 2 (two) times daily. As needed) 30 g 1    [DISCONTINUED] nystatin (MYCOSTATIN) cream Apply topically 2 (two) times daily as needed. 30 g 1     No current facility-administered medications on file prior to visit.            ROS:  GENERAL: No fever, chills,  or significant weight changes.   CARDIOVASCULAR: Denies chest pain, PND, orthopnea or reduced exercise tolerance.  ABDOMEN: Appetite fine. Denies diarrhea, abdominal pain, hematemesis or blood in stool.  URINARY: No flank pain, dysuria or hematuria.    Vitals:    08/07/17 1034   BP: 126/71   Pulse: 103   Temp: 98.2 °F (36.8 °C)   TempSrc: Oral   Weight: 81.1 kg (178 lb 10.9 oz)   Height: 5' 5" (1.651 m)       Wt Readings from Last 3 Encounters:   08/07/17 81.1 kg (178 lb 10.9 oz)   07/13/17 80.3 kg (177 lb)   07/06/17 78 kg (171 lb 15.3 oz)       APPEARANCE: Well nourished, well developed, in no acute distress.    HEAD: Normocephalic.  Atraumatic.  EYES:   Right eye: Pupil reactive.  Conjunctiva clear.    Left eye: Pupil reactive.  Conjunctiva clear.    NECK: Supple. No bruits.  No JVD.  No cervical lymphadenopathy.  No thyromegaly.    CHEST: Breath sounds clear bilaterally.  Normal respiratory effort  CARDIOVASCULAR: Normal rate.  Regular rhythm.  No murmurs.  No rub.  No gallops.   No edema.  MENTAL STATUS: Alert.  Oriented x 3.          Eunice was seen today for follow-up.    Diagnoses and " all orders for this visit:    Chest pain syndrome    Hypertension associated with diabetes      Continue current treatment.  Keep follow-up appointments scheduled regarding cardiology testing.  Follow-up laboratory hemoglobin A1c January as scheduled

## 2017-08-09 ENCOUNTER — CLINICAL SUPPORT (OUTPATIENT)
Dept: CARDIOLOGY | Facility: CLINIC | Age: 72
End: 2017-08-09
Payer: MEDICARE

## 2017-08-09 DIAGNOSIS — R06.09 DOE (DYSPNEA ON EXERTION): ICD-10-CM

## 2017-08-09 DIAGNOSIS — E78.2 COMBINED HYPERLIPIDEMIA ASSOCIATED WITH TYPE 2 DIABETES MELLITUS: ICD-10-CM

## 2017-08-09 DIAGNOSIS — I15.2 HYPERTENSION ASSOCIATED WITH DIABETES: ICD-10-CM

## 2017-08-09 DIAGNOSIS — E11.69 COMBINED HYPERLIPIDEMIA ASSOCIATED WITH TYPE 2 DIABETES MELLITUS: ICD-10-CM

## 2017-08-09 DIAGNOSIS — G62.9 NEUROPATHY: ICD-10-CM

## 2017-08-09 DIAGNOSIS — R07.9 CHEST PAIN SYNDROME: ICD-10-CM

## 2017-08-09 DIAGNOSIS — E78.2 MIXED HYPERLIPIDEMIA: ICD-10-CM

## 2017-08-09 DIAGNOSIS — E11.59 HYPERTENSION ASSOCIATED WITH DIABETES: ICD-10-CM

## 2017-08-09 PROCEDURE — 93018 CV STRESS TEST I&R ONLY: CPT | Mod: S$PBB,,, | Performed by: INTERNAL MEDICINE

## 2017-08-09 PROCEDURE — 93016 CV STRESS TEST SUPVJ ONLY: CPT | Mod: S$PBB,,, | Performed by: INTERNAL MEDICINE

## 2017-08-09 PROCEDURE — 78452 HT MUSCLE IMAGE SPECT MULT: CPT | Mod: 26,S$PBB,, | Performed by: INTERNAL MEDICINE

## 2017-08-10 ENCOUNTER — TELEPHONE (OUTPATIENT)
Dept: CARDIOLOGY | Facility: CLINIC | Age: 72
End: 2017-08-10

## 2017-08-10 LAB — DIASTOLIC DYSFUNCTION: NO

## 2017-08-10 RX ORDER — ROPINIROLE 1 MG/1
TABLET, FILM COATED ORAL
Qty: 90 TABLET | Refills: 3 | Status: SHIPPED | OUTPATIENT
Start: 2017-08-10 | End: 2019-03-19 | Stop reason: SDUPTHER

## 2017-08-22 RX ORDER — LISINOPRIL 20 MG/1
TABLET ORAL
Qty: 90 TABLET | Refills: 3 | Status: SHIPPED | OUTPATIENT
Start: 2017-08-22 | End: 2019-03-14

## 2017-08-28 RX ORDER — GABAPENTIN 300 MG/1
CAPSULE ORAL
Qty: 180 CAPSULE | Refills: 1 | Status: SHIPPED | OUTPATIENT
Start: 2017-08-28 | End: 2018-02-27 | Stop reason: SDUPTHER

## 2017-10-03 RX ORDER — TRAMADOL HYDROCHLORIDE 50 MG/1
TABLET ORAL
Qty: 20 TABLET | Refills: 0 | Status: SHIPPED | OUTPATIENT
Start: 2017-10-03 | End: 2018-01-15 | Stop reason: SDUPTHER

## 2017-10-18 ENCOUNTER — TELEPHONE (OUTPATIENT)
Dept: FAMILY MEDICINE | Facility: CLINIC | Age: 72
End: 2017-10-18

## 2017-10-18 DIAGNOSIS — Z12.39 SCREENING FOR BREAST CANCER: Primary | ICD-10-CM

## 2017-10-18 NOTE — TELEPHONE ENCOUNTER
----- Message from Tika Loo sent at 10/18/2017  8:26 AM CDT -----  Contact: pt  The pt wants to schedule a mammo appt, no orders in the system, pt request a call at [phone]///[id number]

## 2017-11-01 ENCOUNTER — HOSPITAL ENCOUNTER (OUTPATIENT)
Dept: RADIOLOGY | Facility: HOSPITAL | Age: 72
Discharge: HOME | End: 2017-11-01
Attending: FAMILY MEDICINE
Payer: MEDICARE

## 2017-11-01 VITALS — BODY MASS INDEX: 29.66 KG/M2 | WEIGHT: 178 LBS | HEIGHT: 65 IN

## 2017-11-01 DIAGNOSIS — Z12.39 SCREENING FOR BREAST CANCER: ICD-10-CM

## 2017-11-01 PROCEDURE — 77067 SCR MAMMO BI INCL CAD: CPT | Mod: 26,,, | Performed by: RADIOLOGY

## 2017-11-01 PROCEDURE — 77063 BREAST TOMOSYNTHESIS BI: CPT | Mod: 26,,, | Performed by: RADIOLOGY

## 2017-11-01 PROCEDURE — 77067 SCR MAMMO BI INCL CAD: CPT | Mod: TC

## 2017-11-02 DIAGNOSIS — Z23 NEED FOR PROPHYLACTIC VACCINATION AND INOCULATION AGAINST INFLUENZA: Primary | ICD-10-CM

## 2017-11-14 NOTE — TELEPHONE ENCOUNTER
----- Message from Sofya Hoyt sent at 11/14/2017 10:37 AM CST -----  Contact: [facility]  Calling regarding the RX for diabetic medication on patient. Please call [facility] @ [phone]. Thanks, marina

## 2017-11-15 NOTE — TELEPHONE ENCOUNTER
Pharmacist asking if patient should be on glimeperide and Janumet.  Patient told them she takes these daily but has not filled Janumet since May.

## 2017-11-15 NOTE — TELEPHONE ENCOUNTER
Please see which she is doing with this.  Previously was supposed to be taking it twice a day, when she saw the podiatrist apparently told them she was taking it once a day which may be why prescription lasted longer than it should have.  Whenever she was taking when we did her last set of blood work seems to be working and I would continue with this

## 2017-11-16 NOTE — TELEPHONE ENCOUNTER
Patient reports she is taking the Janumet twice daily, and takes the glimepiride some days, not daily.  Reports her sugars have been fine, needs a refill on the Janumet as a 3 month supply

## 2018-01-09 ENCOUNTER — PATIENT OUTREACH (OUTPATIENT)
Dept: ADMINISTRATIVE | Facility: CLINIC | Age: 73
End: 2018-01-09

## 2018-01-09 NOTE — PATIENT INSTRUCTIONS
Symptoms of a Heart Attack       A heart attack is also known as acute myocardial infarction, or AMI. It's an urgent message from your heart that its starved for oxygen. When a clot blocks a blood vessel feeding the heart (coronary artery), oxygen-rich blood cant reach a part or all of your heart. Then tissues of the heart muscle start to die. This causes symptoms of a heart attack. The sooner you get to the hospital; the sooner treatment can start to help save your life and your heart.  Dont be afraid to call 911, even if youre not sure you are having a heart attack. If you dont know the cause of your symptoms, assume its a heart attack. Play it safe and get medical help. Do not drive yourself to the emergency department.   Warning signs of a heart attack  · Chest discomfort. Most heart attacks involve discomfort in the center of the chest that lasts more than a few minutes, or that goes away and comes back. It can feel like uncomfortable pressure, squeezing, burning, fullness, tightness, or pain. It is often described as something heavy sitting on your chest.  · Discomfort in other areas of the upper body. Symptoms can include pain or discomfort in one or both arms, the back, neck, jaw or stomach.  · Shortness of breath with or without chest discomfort.  · Other signs may include breaking out in a cold sweat, nausea, or lightheadedness.  Note for women: Like men, women most commonly have chest pain or discomfort as a heart attack symptom. But women are somewhat more likely than men to have some of the other common symptoms, particularly shortness of breath, nausea, and vomiting, back pain, or jaw pain.  Older people may also have atypical symptoms. The symptoms include loss of consciousness (syncope), weakness, or confusion (delirium). These symptoms should be evaluated immediately. Ignoring them can lead to critical illness or death.  If you have diabetes, high blood sugar can damage nerves in your body  over time. This may keep you from feeling pain caused by a heart problem, leading to a silent heart problem. If you dont feel symptoms, you are less able to get treatment right away. Talk to your healthcare provider about how to lower your risk for silent heart problems.  People who have had one heart attack are at risk for having another heart attack. Your provider may prescribe medicine such as nitroglycerin to take for chest pain. You may also need medicine to lower your heart rate and blood pressure to prevent angina and another heart attack. Remember to take any medicines your provider has given as directed. Do not stop these medicines without speaking with him or her first.  Date Last Reviewed: 6/1/2016  © 0063-7468 [email or web address]. [address] [place] [address], [place] [address]. All rights reserved. This information is not intended as a substitute for professional medical care. Always follow your healthcare professional's instructions.

## 2018-01-15 ENCOUNTER — LAB VISIT (OUTPATIENT)
Dept: LAB | Facility: HOSPITAL | Age: 73
End: 2018-01-15
Attending: FAMILY MEDICINE
Payer: MEDICARE

## 2018-01-15 ENCOUNTER — OFFICE VISIT (OUTPATIENT)
Dept: FAMILY MEDICINE | Facility: CLINIC | Age: 73
End: 2018-01-15
Payer: MEDICARE

## 2018-01-15 VITALS
BODY MASS INDEX: 29.42 KG/M2 | DIASTOLIC BLOOD PRESSURE: 75 MMHG | HEART RATE: 79 BPM | WEIGHT: 176.56 LBS | TEMPERATURE: 98 F | SYSTOLIC BLOOD PRESSURE: 126 MMHG | HEIGHT: 65 IN

## 2018-01-15 DIAGNOSIS — Z78.9 STATIN INTOLERANCE: ICD-10-CM

## 2018-01-15 DIAGNOSIS — K29.50 CHRONIC GASTRITIS, PRESENCE OF BLEEDING UNSPECIFIED, UNSPECIFIED GASTRITIS TYPE: ICD-10-CM

## 2018-01-15 DIAGNOSIS — K44.9 HIATAL HERNIA: ICD-10-CM

## 2018-01-15 DIAGNOSIS — R07.89 NON-CARDIAC CHEST PAIN: Primary | ICD-10-CM

## 2018-01-15 DIAGNOSIS — K59.00 CONSTIPATION, UNSPECIFIED CONSTIPATION TYPE: ICD-10-CM

## 2018-01-15 DIAGNOSIS — E78.2 COMBINED HYPERLIPIDEMIA ASSOCIATED WITH TYPE 2 DIABETES MELLITUS: ICD-10-CM

## 2018-01-15 DIAGNOSIS — F43.9 STRESS: ICD-10-CM

## 2018-01-15 DIAGNOSIS — I15.2 HYPERTENSION ASSOCIATED WITH DIABETES: ICD-10-CM

## 2018-01-15 DIAGNOSIS — E11.69 COMBINED HYPERLIPIDEMIA ASSOCIATED WITH TYPE 2 DIABETES MELLITUS: ICD-10-CM

## 2018-01-15 DIAGNOSIS — E11.59 HYPERTENSION ASSOCIATED WITH DIABETES: ICD-10-CM

## 2018-01-15 DIAGNOSIS — M54.6 MIDLINE THORACIC BACK PAIN, UNSPECIFIED CHRONICITY: ICD-10-CM

## 2018-01-15 DIAGNOSIS — E11.9 TYPE 2 DIABETES MELLITUS WITHOUT COMPLICATION: ICD-10-CM

## 2018-01-15 DIAGNOSIS — M54.50 BILATERAL LOW BACK PAIN WITHOUT SCIATICA, UNSPECIFIED CHRONICITY: ICD-10-CM

## 2018-01-15 LAB
ESTIMATED AVG GLUCOSE: 177 MG/DL
HBA1C MFR BLD HPLC: 7.8 %

## 2018-01-15 PROCEDURE — 99495 TRANSJ CARE MGMT MOD F2F 14D: CPT | Mod: PBBFAC,PO | Performed by: FAMILY MEDICINE

## 2018-01-15 PROCEDURE — 99999 PR PBB SHADOW E&M-EST. PATIENT-LVL IV: CPT | Mod: PBBFAC,,, | Performed by: FAMILY MEDICINE

## 2018-01-15 PROCEDURE — 83036 HEMOGLOBIN GLYCOSYLATED A1C: CPT

## 2018-01-15 PROCEDURE — 99214 OFFICE O/P EST MOD 30 MIN: CPT | Mod: PBBFAC,PO | Performed by: FAMILY MEDICINE

## 2018-01-15 PROCEDURE — 99495 TRANSJ CARE MGMT MOD F2F 14D: CPT | Mod: S$PBB,,, | Performed by: FAMILY MEDICINE

## 2018-01-15 PROCEDURE — 36415 COLL VENOUS BLD VENIPUNCTURE: CPT | Mod: PO

## 2018-01-15 RX ORDER — ONDANSETRON 4 MG/1
4 TABLET, FILM COATED ORAL EVERY 8 HOURS PRN
Qty: 16 TABLET | Refills: 0 | Status: SHIPPED | OUTPATIENT
Start: 2018-01-15 | End: 2019-03-12 | Stop reason: SDUPTHER

## 2018-01-15 RX ORDER — TRAMADOL HYDROCHLORIDE 50 MG/1
50 TABLET ORAL EVERY 8 HOURS PRN
Qty: 20 TABLET | Refills: 0 | Status: SHIPPED | OUTPATIENT
Start: 2018-01-15 | End: 2019-03-14

## 2018-01-15 NOTE — PROGRESS NOTES
Patient presents follow-up noncardiac chest pain.  2 negative nuclear stress test within past 6 months.  Statin intolerant.  She relates symptoms now distress as she took a dose of Valium and states this helped not interested in a regular medication for this.  She does have problems with hiatal hernia and gastritis previously.  Was noted to have food still in the stomach twice on previous EGD- she does have diabetes, but has not been diagnosed with gastroparesis store she is aware.  She is using Janumet, but doesn't want to change to something else.  She does get some constipation.  She does use tramadol infrequently for her back.       Jaswinder Mccoy DO - 01/07/2018 7:38 PM CST  Formatting of this note may be different from the original.  [facility] DISCHARGE SUMMARY    Patient ID:  Eunice Hutson  [id number]  72 y.o.  1945    Admit Date:   1/6/2018 8:44 PM    Discharge Date:   Discharge Today: 1/7/2018    Admitting Physician:   Jaswinder Mccoy DO     Discharge Physician:   JASWINDER MCCOY DO    Consults:  Consultants   Provider Service Role Specialty   Cher Thompson, NP - Nurse Practitioner Nurse Practitioner       Reason for Admission/Admission Diagnoses:   Present on Admission:   Acute chest pain   DM type 2 (diabetes mellitus, type 2)   CKD (chronic kidney disease) stage 3, GFR 30-59 ml/min   Combined hyperlipidemia associated with type 2 diabetes mellitus   Hypertension associated with diabetes   ACS (acute coronary syndrome)    Discharge Diagnoses:   Active Hospital Problems   Diagnosis Date Noted    ACS (acute coronary syndrome) 01/07/2018    CKD (chronic kidney disease) stage 3, GFR 30-59 ml/min 01/07/2018   Chronic    Acute chest pain 01/06/2018    Combined hyperlipidemia associated with type 2 diabetes mellitus 10/23/2012   Chronic    Hypertension associated with diabetes 10/23/2012   Chronic    DM type 2 (diabetes mellitus, type 2) 02/27/2012   Chronic     Resolved Hospital Problems    Diagnosis Date Noted Date Resolved     History of Present Illness:   Ms. Hutson was admitted this morning due to chest pain which the patient described as being intermittent and occurring over the past two days. This pain was squeezing in nature and was not made worse with activity. She did acknowledge positional changes causing this pain.    Hospital Course and Treatment:   Admission Information   Date & Time  1/6/2018 Provider  Jaswinder Robb DO Department  [facility] Dept. Phone  [phone]         The patient had negative cardiac enzymes 6 hours apart and both were negative. She had a nuclear stress which was negative and I discussed with her that these results indicate that her chest pain is likely not cardiac in nature. She needs to follow up with her PCP this week and return to ER if this atypical chest pain worsens.    I discussed with the patient disease process and treatment.     I have personally seen and examined the patient, Eunice Hutson, in a face to face encounter on the date of discharge.     She is cleared for discharge with instructions to follow up as directed.   Total time in the care and discharge planning of this patient was greater than 30 minutes.      ED Provider Notes - Maira Jensen MD - 01/13/2018 11:14 AM CST  Formatting of this note may be different from the original.    Triage Note Reviewed    History     Chief Complaint   Patient presents with    Chest Pain     HPI    Patient is a 72-year-old female with past medical history of hypertension, chronic kidney disease, anemia, chronic gastritis, hyperlipidemia, diabetes who presents with chest pain. Patient reports that she has had 1 week of a constant sharp pain in her left chest that is nonradiating. Reports that it is worse with movement and worse with coughing. Reports dry cough for the last 1.5 weeks and also feeling somewhat feverish at times. Reports that she did not take her  "temperature. Denies any abdominal pain or nausea or vomiting. Reports mild shortness of breath with walking. Denies history of heart attack in the past. Denies history of blood clot or lower extremity swelling or pain.    Review of Systems   Constitutional: Positive for fever. Negative for chills.   HENT: Negative for congestion and rhinorrhea.   Respiratory: Positive for cough and shortness of breath.   Cardiovascular: Positive for chest pain. Negative for palpitations.   Gastrointestinal: Negative for abdominal distention and abdominal pain.   All review of systems (12 point) negative except as stated above in HPI        Allergies   Allergen Reactions    Atorvastatin Other (See Comments)   Leg cramps    Caffeine Other (See Comments)   Makes hyper  "Makes me high"    Codeine Itching   Other reaction(s): Rash  Able to take Lortab  Took a percocet    Pravastatin Other (See Comments)   Leg cramps    Pregabalin Other (See Comments)    Sulfamethoxazole-Trimethoprim Other (See Comments)     Past Medical History:   Diagnosis Date    Acid reflux    Anemia    Chronic gastritis    CKD (chronic kidney disease) stage 3, GFR 30-59 ml/min    Constipation    Depression    Diverticulosis    GERD (gastroesophageal reflux disease)    Glaucoma (increased eye pressure)    High cholesterol    History of blood transfusion    Hypertension   Goal< 130/80    RLS (restless legs syndrome)    Type II diabetes mellitus     Past Surgical History:   Procedure Laterality Date    Arm surgery    Back surgery    Hemorrhoid surgery N/A 12/4/2015   Procedure: HEMORRHOIDECTOMY; Surgeon: Lee Mccord MD; Location: [facility] MAIN OR; Service: General; Laterality: N/A;    Toe surgery    Tubal ligation       Family History   Problem Relation Age of Onset    Heart disease Mother    Lung cancer Father    Prostate cancer Father    Ovarian cancer Sister    Diabetes Sister    Colon cancer Brother    Kidney disease Brother    No Known " "Problems Son    No Known Problems Daughter     Social History   Substance Use Topics    Smoking status: Passive Smoke Exposure - Never Smoker   Types: Cigarettes    Smokeless tobacco: Never Used    Alcohol use No     Physical Exam     Visit Vitals  /57   Pulse 70   Temp 98.4 °F (36.9 °C) (Oral)   Resp 20   Ht 5' 5" (1.651 m)   Wt 176 lb (79.8 kg)   SpO2 96%   BMI 29.29 kg/m²     Physical Exam   Constitutional: She is oriented to person, place, and time. She appears well-developed and well-nourished. No distress.   HENT:   Head: Normocephalic and atraumatic.   Eyes: Conjunctivae and EOM are normal.   Neck: Normal range of motion. Neck supple.   Cardiovascular: Normal rate, normal heart sounds and intact distal pulses.   Pulmonary/Chest: Effort normal and breath sounds normal. No respiratory distress. She has no wheezes. She exhibits no tenderness.   Abdominal: Soft. Bowel sounds are normal. She exhibits no distension. There is no tenderness.   Musculoskeletal: Normal range of motion. She exhibits no edema, tenderness or deformity.   Neurological: She is alert and oriented to person, place, and time.   Skin: Skin is warm and dry. She is not diaphoretic.   Psychiatric: She has a normal mood and affect. Her behavior is normal.   Nursing note and vitals reviewed.    ED Course     Labs Reviewed   CBC WITH DIFFERENTIAL - Abnormal; Notable for the following:   Result Value   RBC 4.09 (*)   HCT 36.7 (*)   All other components within normal limits   COMPREHENSIVE METABOLIC PANEL - Abnormal; Notable for the following:   Glucose 183 (*)   Total Bilirubin 0.3 (*)   All other components within normal limits   GLOMERULAR FILTRATION RATE - Abnormal; Notable for the following:   GFR Non  53 (*)   All other components within normal limits   D-DIMER - Abnormal; Notable for the following:   D-Dimer 529 (*)   All other components within normal limits   TROPONIN I   INFLUENZA A & B AG DETECTION   TROPONIN I "     Lab Results for last 36Hrs:  Recent Results (from the past 36 hour(s))   CBC with Differential   Collection Time: 01/13/18 11:20 AM   Result Value Ref Range   WBC 5.3 4.8 - 10.8 10*3/uL   RBC 4.09 (L) 4.20 - 5.40 10*6/uL   HGB 12.1 12.0 - 16.0 g/dL   HCT 36.7 (L) 37.0 - 47.0 %   MCV 89.8 81.0 - 99.0 fL   MCH 29.7 27.0 - 31.0 pg   MCHC 33.1 33.0 - 37.0 g/dL   RDW 13.2 11.5 - 14.5 %   Platelet Count 212 130 - 400 10*3/uL   MPV 8.8 7.4 - 10.4 fL   Neutrophils Percent 50.0 36.0 - 66.0 %   Lymphocytes Percent 42.0 21.0 - 50.0 %   Monocytes Percent 6.0 2.0 - 10.0 %   Eosinophils Percent 2.0 0.0 - 10.0 %   Basophils Percent 1.0 0.0 - 1.0 %   Neutrophils Absolute 2.6 1.4 - 6.5 10*3/uL   Lymphocytes Absolute 2.2 1.2 - 3.4 10*3/uL   Monocytes Absolute 0.3 0.1 - 1.0 10*3/uL   Eosinophils Absolute 0.1 0.0 - 0.7 10*3/uL   Basophils Absolute 0.0 0.0 - 0.2 10*3/uL   Troponin I   Collection Time: 01/13/18 11:20 AM   Result Value Ref Range   Troponin I <0.03 0.00 - 0.04 ng/mL   Comprehensive metabolic panel   Collection Time: 01/13/18 11:20 AM   Result Value Ref Range   Glucose 183 (H) 65 - 99 mg/dL   Sodium 137 136 - 144 mmol/L   Potassium 4.1 3.6 - 5.1 mmol/L   Chloride 101 101 - 111 mmol/L   CO2 26 22 - 32 mmol/L   BUN 17 8 - 20 mg/dL   Calcium 9.6 8.9 - 10.3 mg/dL   Creatinine 1.03 0.60 - 1.10 mg/dL   Albumin 3.9 3.5 - 4.8 g/dL   Total Bilirubin 0.3 (L) 0.4 - 2.0 mg/dL   ALKP 93 28 - 116 U/L   Total Protein 7.3 6.1 - 7.9 g/dL   ALT 9 5 - 41 U/L   AST 16 10 - 34 U/L   Anion Gap 10 7 - 16 mmol/L   Rapid influenza A/B antigens   Collection Time: 01/13/18 11:20 AM   Result Value Ref Range   Influenza A Ag NEG NEGATIVE   Influenza B Ag NEG NEGATIVE   Glomerular Filtration Rate   Collection Time: 01/13/18 11:20 AM   Result Value Ref Range   GFR Non  53 (A) >59 mL/min   GFR African American >60 >59 mL/min   D-dimer, quantitative   Collection Time: 01/13/18 11:20 AM   Result Value Ref Range   D-Dimer 529 (H) 0 - 500  ng[FEU]/mL   Troponin I   Collection Time: 01/13/18 4:04 PM   Result Value Ref Range   Troponin I <0.03 0.00 - 0.04 ng/mL     Diagnostic Results for last 36Hrs:  Ct Angiogram Pe Protocol W Contrast    Result Date: 1/13/2018  REASON FOR EXAM: cp, elevated d-dimer TECHNICAL FACTORS: Multiple contiguous axial CT images were obtained of the chest after administration of intravenous contrast, with post-processing including maximum-intensity projection (MIP) reconstruction. Images are stored in the patient's permanent medical record. Automated exposure control was utilized for radiation dose reduction. DOSE: 70 mL Isovue-370 COMPARISON: None FINDINGS: The pulmonary arteries distribute and opacify normally. There is no filling defect observed. The lungs are symmetrically aerated. Mild patchy nonspecific groundglass densities may indicate volume loss. There is no focal consolidation, pleural effusion, or pneumothorax. There is no mediastinal lymphadenopathy. Normal heart size with coronary vessel atherosclerosis. Trace pericardial effusion is noted anteriorly. There is a small hiatal hernia. The visualized upper abdominal structures appear unremarkable. Osseous structures are notable for mild degenerative changes.     1. No evidence of pulmonary thromboembolism. 2. Coronary vessel atherosclerosis. 3. Nonspecific patchy groundglass pulmonary opacities which may indicate volume loss. Electronically signed by Brandt Juarez MD on 1/13/2018 1:36 PM     Xr Chest Ap Portable    Result Date: 1/13/2018  REASON FOR EXAM: cough, cp TECHNICAL FACTORS: 1 view(s) COMPARISON: 01/06/2018 FINDINGS: The lungs are clear. The cardiac silhouette and pulmonary vascularity are within normal limits. There is no evidence of pleural effusion or pneumothorax. There are degenerative changes of the vertebral column.     No acute findings or adverse interval change. Electronically signed by Brandt Juarez MD on 1/13/2018 1:46 PM     Wet Read Results  XR  Chest AP Portable   Final Result     No acute findings or adverse interval change.     Electronically signed by Brandt Juarez MD on 1/13/2018 1:46 PM       CT Angiogram PE Protocol W Contrast   Final Result   1. No evidence of pulmonary thromboembolism.   2. Coronary vessel atherosclerosis.   3. Nonspecific patchy groundglass pulmonary opacities which may indicate volume loss.         Electronically signed by Brandt Juarez MD on 1/13/2018 1:36 PM         Procedures  EKG interpretation by ED physician, Dr. Jensen  1/13/18  10:55am  nsr at 76 bpm  No stemi  No long qtc  Normal axis  No wide qrs complex    ED Course     MDM    Patient is a 72-year-old female who presents with chest pain. Reports continuous chest pain over the last 1 week that is being constant sharp in her left chest. Reports that it is worse with coughing.    EKG interpreted as above.    2 sets of troponins obtained 4.5 hours apart that are not elevated. Would expect elevation given the duration of symptoms if this was ACS. Patient with elevated d-dimer. CTA of the chest showing atherosclerosis but no evidence of PTE.    Patient with recent stress test on 1/7/18 with no evidence of ischemia, normal left ventricular function.    She is given aspirin 324 mg by mouth as well as given Norco here and reports that she is pain-free.    She'll be discharged home with instructions to follow up with the primary care provider and cardiologist (given 2nd presentation for chest pain) in the next 1-3 days and she is instructed to return to ER with any worsening symptoms.    ED Critical Care Time    Diagnosis:  Final diagnoses:   Chest pain, unspecified type       Maira Jensen MD  01/13/18 [id number]      Past Medical History:  Past Medical History:   Diagnosis Date    Anemia     Blood transfusion     Chronic gastritis     Depression     Diabetes mellitus with stage 3 chronic kidney disease     Diverticulosis     DM type 2 (diabetes mellitus, type 2)  2/27/2012    GERD (gastroesophageal reflux disease)     Glaucoma (increased eye pressure)     Hiatal hernia     Hyperlipidemia LDL goal < 100 2/27/2012    Hypertension goal BP (blood pressure) < 130/80 2/27/2012    RLS (restless legs syndrome)     Statin intolerance 1/15/2018     Past Surgical History:   Procedure Laterality Date    BACK SURGERY  01/05/2011    BREAST BIOPSY      benign    COLONOSCOPY  11/16/2011    COLONOSCOPY  1/24/1013    ESOPHAGOGASTRODUODENOSCOPY  11/17/2011    ESOPHAGOGASTRODUODENOSCOPY  1/16/2013,2/29/16    EYE SURGERY      FOOT SURGERY       derotational arthroplasty of the fifth digit right foot     HEMORRHOID SURGERY      SHOULDER ARTHROSCOPY      TUBAL LIGATION      VAGINAL DELIVERY      times 2     Social History     Social History    Marital status:      Spouse name: N/A    Number of children: N/A    Years of education: N/A     Occupational History    retired accounting      Social History Main Topics    Smoking status: Never Smoker    Smokeless tobacco: Never Used    Alcohol use No    Drug use: No    Sexual activity: No     Other Topics Concern    Not on file     Social History Narrative    No narrative on file     Family History   Problem Relation Age of Onset    Heart disease Mother     Prostate cancer Father     Lung cancer Father     Ovarian cancer Sister     Colon cancer Brother 43    Cancer Brother      Colon    Kidney disease Brother      Kidney transplant    Diabetes Sister      Review of patient's allergies indicates:   Allergen Reactions    Bactrim [sulfamethoxazole-trimethoprim]     Caffeine Other (See Comments)     Makes hyper    Codeine      Other reaction(s): Rash  Able to take Lortab    Lipitor [atorvastatin] Other (See Comments)     Leg cramps    Lyrica [pregabalin] Hallucinations    Pravastatin Other (See Comments)     Leg cramps     Current Outpatient Prescriptions on File Prior to Visit   Medication Sig Dispense  Refill    conjugated estrogens (PREMARIN) vaginal cream Place 1 g vaginally 3 (three) times a week. 30 g 5    docusate calcium (SURFAK) 240 mg capsule Take 240 mg by mouth daily as needed.       gabapentin (NEURONTIN) 300 MG capsule TAKE ONE CAPSULE BY MOUTH TWICE DAILY 180 capsule 1    glimepiride (AMARYL) 1 MG tablet Take 1 mg by mouth before breakfast. Patient taking 1mg po QD, prn, per self      latanoprost (XALATAN) 0.005 % ophthalmic solution Place 1 drop into both eyes every evening.        lisinopril (PRINIVIL,ZESTRIL) 20 MG tablet TAKE ONE TABLET BY MOUTH EVERY DAY 90 tablet 3    omeprazole (PRILOSEC) 20 MG capsule TAKE TWO CAPSULES BY MOUTH EVERY  capsule 3    polyethylene glycol (GLYCOLAX) 17 gram/dose powder Take 17 g by mouth once daily. Mix in water or juice 510 g prn    PROCTOZONE-HC 2.5 % rectal cream PLACE RECTALLY TWICE DAILY (Patient taking differently: PLACE RECTALLY TWICE DAILY as needed) 30 g 1    ropinirole (REQUIP) 1 MG tablet TAKE ONE TABLET BY MOUTH NIGHTLY 90 tablet 3    SITagliptan-metformin (JANUMET) 50-1,000 mg per tablet Take 1 tablet by mouth 2 (two) times daily with meals. 180 tablet 1    triamcinolone acetonide 0.1% (KENALOG) 0.1 % cream Apply topically 2 (two) times daily. (Patient taking differently: Apply topically 2 (two) times daily. As needed) 30 g 1    [DISCONTINUED] ondansetron (ZOFRAN) 4 MG tablet Take 1 tablet (4 mg total) by mouth every 8 (eight) hours as needed. AS NEEDED FOR NAUSEA 12 tablet 0    [DISCONTINUED] tramadol (ULTRAM) 50 mg tablet TAKE ONE TABLET BY MOUTH EVERY 8 HOURS AS NEEDED 20 tablet 0    cyclobenzaprine (FLEXERIL) 5 MG tablet Take 1 tablet (5 mg total) by mouth 3 (three) times daily as needed for Muscle spasms. 30 tablet 0     No current facility-administered medications on file prior to visit.            ROS:  GENERAL: No fever, chills,  or significant weight changes.   CARDIOVASCULAR: Denies exertional chest pain, PND,  "orthopnea.  ABDOMEN: Appetite fine. Denies diarrhea,  hematemesis or blood in stool.  URINARY: No flank pain, dysuria or hematuria.      OBJECTIVE:     Vitals:    01/15/18 0955   BP: 126/75   Pulse: 79   Temp: 97.6 °F (36.4 °C)   TempSrc: Oral   Weight: 80.1 kg (176 lb 9.4 oz)   Height: 5' 5" (1.651 m)     Wt Readings from Last 3 Encounters:   01/15/18 80.1 kg (176 lb 9.4 oz)   11/01/17 80.7 kg (178 lb)   08/07/17 81.1 kg (178 lb 10.9 oz)     APPEARANCE: Well nourished, well developed, in no acute distress.    HEAD: Normocephalic.  Atraumatic.  No sinus tenderness.  EYES:   Right eye: Pupil reactive.  Conjunctiva clear.    Left eye: Pupil reactive.  Conjunctiva clear.    Both fundi:  Grossly normal to nondilated exam. EOMI.    EARS: TM's intact. Light reflex normal. No retraction or perforation.    NOSE:  clear.  MOUTH & THROAT:  No pharyngeal erythema or exudate. No lesions.  NECK: Supple. No bruits.  No JVD.  No cervical lymphadenopathy.  No thyromegaly.    CHEST: Breath sounds clear bilaterally.  Normal respiratory effort  CARDIOVASCULAR: Normal rate.  Regular rhythm.  No murmurs.  No rub.  No gallops.  ABDOMEN: Bowel sounds normal.  Soft.  No tenderness.  No organomegaly.  PERIPHERAL VASCULAR: No cyanosis.  No clubbing.  No edema.  NEUROLOGIC: No focal findings.  MENTAL STATUS: Alert.  Oriented x 3.          Eunice was seen today for follow-up.    Diagnoses and all orders for this visit:    Non-cardiac chest pain  -     Ambulatory referral to Gastroenterology    Statin intolerance    Chronic gastritis, presence of bleeding unspecified, unspecified gastritis type  -     Ambulatory referral to Gastroenterology    Hypertension associated with diabetes    Combined hyperlipidemia associated with type 2 diabetes mellitus    Constipation, unspecified constipation type  -     Ambulatory referral to Gastroenterology    Hiatal hernia  -     Ambulatory referral to Gastroenterology    Stress    Midline thoracic back pain, " unspecified chronicity    Bilateral low back pain without sciatica, unspecified chronicity    Other orders  -     traMADol (ULTRAM) 50 mg tablet; Take 1 tablet (50 mg total) by mouth every 8 (eight) hours as needed.  -     ondansetron (ZOFRAN) 4 MG tablet; Take 1 tablet (4 mg total) by mouth every 8 (eight) hours as needed for Nausea.      Follow-up laboratory pending.  Continue PPI.    Transitional Care Note    Family and/or Caretaker present at visit?  No.  Diagnostic tests reviewed/disposition: I have reviewed all completed as well as pending diagnostic tests at the time of discharge.  Disease/illness education: Yes  Home health/community services discussion/referrals: Patient does not have home health established from hospital visit.  They do not need home health.  If needed, we will set up home health for the patient.   Establishment or re-establishment of referral orders for community resources: No other necessary community resources.   Discussion with other health care providers: No discussion with other health care providers necessary.

## 2018-02-06 ENCOUNTER — TELEPHONE (OUTPATIENT)
Dept: FAMILY MEDICINE | Facility: CLINIC | Age: 73
End: 2018-02-06

## 2018-02-06 DIAGNOSIS — I15.2 HYPERTENSION ASSOCIATED WITH DIABETES: Primary | ICD-10-CM

## 2018-02-06 DIAGNOSIS — E11.59 HYPERTENSION ASSOCIATED WITH DIABETES: Primary | ICD-10-CM

## 2018-02-06 RX ORDER — GLIMEPIRIDE 2 MG/1
1 TABLET ORAL
COMMUNITY
End: 2019-03-14

## 2018-02-21 ENCOUNTER — OFFICE VISIT (OUTPATIENT)
Dept: HEMATOLOGY/ONCOLOGY | Facility: CLINIC | Age: 73
End: 2018-02-21
Payer: MEDICARE

## 2018-02-21 ENCOUNTER — HOSPITAL ENCOUNTER (OUTPATIENT)
Dept: RADIOLOGY | Facility: HOSPITAL | Age: 73
Discharge: HOME | End: 2018-02-21
Attending: INTERNAL MEDICINE
Payer: MEDICARE

## 2018-02-21 VITALS
SYSTOLIC BLOOD PRESSURE: 114 MMHG | HEART RATE: 75 BPM | HEIGHT: 65 IN | OXYGEN SATURATION: 98 % | BODY MASS INDEX: 29.31 KG/M2 | WEIGHT: 175.94 LBS | TEMPERATURE: 98 F | DIASTOLIC BLOOD PRESSURE: 68 MMHG

## 2018-02-21 DIAGNOSIS — J98.8 RESPIRATORY INFECTION: ICD-10-CM

## 2018-02-21 DIAGNOSIS — J98.8 RESPIRATORY INFECTION: Primary | ICD-10-CM

## 2018-02-21 DIAGNOSIS — D50.9 IRON DEFICIENCY ANEMIA, UNSPECIFIED IRON DEFICIENCY ANEMIA TYPE: Primary | ICD-10-CM

## 2018-02-21 LAB
FLUAV AG SPEC QL IA: NEGATIVE
FLUBV AG SPEC QL IA: NEGATIVE
SPECIMEN SOURCE: NORMAL

## 2018-02-21 PROCEDURE — 99999 PR PBB SHADOW E&M-EST. PATIENT-LVL III: CPT | Mod: PBBFAC,,, | Performed by: INTERNAL MEDICINE

## 2018-02-21 PROCEDURE — 87400 INFLUENZA A/B EACH AG IA: CPT | Mod: 59,PO

## 2018-02-21 PROCEDURE — 99214 OFFICE O/P EST MOD 30 MIN: CPT | Mod: S$PBB,,, | Performed by: INTERNAL MEDICINE

## 2018-02-21 PROCEDURE — 71046 X-RAY EXAM CHEST 2 VIEWS: CPT | Mod: TC,PO

## 2018-02-21 PROCEDURE — 99213 OFFICE O/P EST LOW 20 MIN: CPT | Mod: PBBFAC,PO | Performed by: INTERNAL MEDICINE

## 2018-02-21 PROCEDURE — 1159F MED LIST DOCD IN RCRD: CPT | Mod: ,,, | Performed by: INTERNAL MEDICINE

## 2018-02-21 PROCEDURE — 1126F AMNT PAIN NOTED NONE PRSNT: CPT | Mod: ,,, | Performed by: INTERNAL MEDICINE

## 2018-02-21 PROCEDURE — 71046 X-RAY EXAM CHEST 2 VIEWS: CPT | Mod: 26,,, | Performed by: RADIOLOGY

## 2018-02-21 RX ORDER — DOXYCYCLINE HYCLATE 100 MG
100 TABLET ORAL EVERY 12 HOURS
Qty: 14 TABLET | Refills: 0 | Status: SHIPPED | OUTPATIENT
Start: 2018-02-21 | End: 2018-02-28

## 2018-02-21 NOTE — PROGRESS NOTES
Reason for visit: Iron deficiency anemia    HPI:   The patient is a 73-year-old  female with a history of chronic iron deficiency anemia who presents to the hematology oncology clinic today for follow-up.  The patient was previously evaluated in the outpatient hematology oncology clinic by Dr. Edd Flanagan.  The patient was last seen by Dr. Flanagan in 2013 and did not keep her scheduled follow-up with him.  She states that she did not see any progress in the treatment of her anemia and so she stopped her follow-up.  She reports that she stop her oral iron supplementation as it was causing severe problems with constipation despite using over-the-counter preparations.  I have reviewed all of the patient's relevant clinical history available in the medical record and have utilized this in my evaluation and management recommendations today.  She denies any chest pain or shortness of breath.  She reports chronic fatigue.  She denies any melena, hematochezia, hematemesis, hemoptysis or hematuria.  She denies any bowel or urinary complaints.  She denies any nausea, vomiting or abdominal pain.  She reports that she follows up regularly with her gastroenterologist Dr. Hampton and is up-to-date with colonoscopy.  I did obtain the records from Dr. Hampton's office and have reviewed them.  No etiology for her iron deficiency anemia was noted when her evaluations were performed in 2013.  From the records it appears that the patient did not have a small bowel evaluation at that time.  The patient reports that she tolerated her recent IV iron infusions in January 2016 well without any significant side effects.  Today she reports that she has been having respiratory tract infection symptoms for the past 3 weeks. She reports occasional episodes of dry cough.    PAST MEDICAL HISTORY:   1.  Hypertension  2.  GERD  3.  Type 2 diabetes mellitus with peripheral neuropathy  4.  Osteoarthritis with degenerative disc  disease  5.  Diverticulosis  6.  Dyslipidemia    SURGICAL HISTORY:   1.  Hemorrhoidectomy  2.  Right shoulder arthroscopy  3.  Back surgery for disc disease  4.  Left breast lumpectomy the results of which were benign    FAMILY HISTORY: The patient's brother  of complications related to colon cancer at the age of 44.  Her father had lung cancer in his 80s.  Her sister had ovarian cancer at the age of 58.  She denies any other immediate family members with cancer or bleeding/clotting disorders.    SOCIAL HISTORY: She has never smoked cigarettes.  She does not drink alcohol.  She has never used any recreational drugs.  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  She is  and has 2 sons.  She lives in [place], [place].    ALLERGIES: Reviewed on medication card.    MEDICATIONS: [Medcard has been reviewed and/or reconciled.]    REVIEW OF SYSTEMS:   GENERAL: [No fevers, chills or sweats. Reports fatigue. Denies weight loss or loss of appetite.]  HEENT: [No blurred vision, tinnitus, nasal discharge, sorethroat or dysphagia.]  HEART: [No chest pain, palpitations or shortness of breath.]    LUNGS: [Reports occasional cough. Denies hemoptysis or breathing problems.]  ABDOMEN: [No abdominal pain, nausea, vomiting, diarrhea, constipation or melena.]  GENITOURINARY: [No dysuria, bleeding or malodorous discharge.]  NEURO: [No headache, dizziness or vertigo.]  HEMATOLOGY: [No easy bruising, spontaneous bleeding or blood clots in the past].  MUSCULOSKELETAL: [No arthralgias, myalgias or bone pains.]  SKIN: [No rashes or skin lesions.]  PSYCHIATRY: [No depression or anxiety.]    PHYSICAL EXAMINATION:   VS: Reviewed on nurse's notes.  APPEARANCE: The patient is a well-developed, well-nourished and well-groomed -American female who appears in no acute distress.  HEENT: No scleral icterus. Both external auditory canals clear. No oral ulcers, lesions. Throat clear  HEAD: No sinus tenderness.  NECK: Supple.  No palpable lymphadenopathy. Thyroid non-tender, no palpable masses  CHEST: Breath sounds clear bilaterally. No rales. No rhonchi. Unlabored respirations.  CARDIOVASCULAR: Normal S1, S2. Normal rate. Regular rhythm.  ABDOMEN: Bowel sounds normal. No tenderness. No abdominal distention. No hepatomegaly. No splenomegaly.  LYMPHATIC: No palpable supraclavicular, axillary nodes  EXTREMITIES: No clubbing, cyanosis, edema  SKIN: No lesions. No petechiae. No ecchymoses. No induration or nodules  NEUROLOGIC: No focal findings. Alert & Oriented x 3. Mood appropriate to affect    LABS:   Reviewed    IMAGING:  Reviewed    IMPRESSION:  1.  Iron deficiency anemia  2.  Respiratory tract infection    PLAN:  1.  I had a detailed discussion with the patient today with regard to the various possible etiologies for her chronic iron deficiency anemia.  2.  We discussed about the treatment of her iron deficiency anemia in detail.  At this time she has completed IV iron infusions.  Her most recent lab work including review of labwork shows resolution of iron deficiency at this time after treatment with no evidence of recurrence. I will check lab work today.  3.  Recent UA to evaluate for any evidence of microhematuria shows no evidence of this.  4. She states that she is uptodate with follow up with Dr. Hampton with regard to GI workup.  5.  I will check influenza antigen.  I will check chest x-ray for further workup for her respiratory tract infection.  Additional management recommendations will depend on results.    Follow up will be determined after review of above results. She knows to call for any additional questions or new problems.    Tay Yates MD

## 2018-02-22 ENCOUNTER — TELEPHONE (OUTPATIENT)
Dept: HEMATOLOGY/ONCOLOGY | Facility: CLINIC | Age: 73
End: 2018-02-22

## 2018-02-22 DIAGNOSIS — D50.9 IRON DEFICIENCY ANEMIA, UNSPECIFIED IRON DEFICIENCY ANEMIA TYPE: Primary | ICD-10-CM

## 2018-02-22 NOTE — TELEPHONE ENCOUNTER
----- Message from Tay Yates MD sent at 2/21/2018  4:27 PM CST -----  Please let the patient know that chest x-ray looks good with no evidence of pneumonia.  Considering that she has had respiratory tract infection symptoms for 3 weeks I have sent a prescription for doxycycline 100 mg by mouth twice a day for 1 week to her pharmacy today.  She needs to pick this up and start taking it.  She needs to contact her primary care physician Dr. Sahni if her symptoms are not better after doing this.  Thank you.

## 2018-02-22 NOTE — TELEPHONE ENCOUNTER
----- Message from Tay Yates MD sent at 2/21/2018  4:26 PM CST -----  Please let her know that flu test is negative.

## 2018-02-27 RX ORDER — GABAPENTIN 300 MG/1
CAPSULE ORAL
Qty: 180 CAPSULE | Refills: 0 | Status: SHIPPED | OUTPATIENT
Start: 2018-02-27 | End: 2019-03-14 | Stop reason: SDUPTHER

## 2018-05-07 RX ORDER — OMEPRAZOLE 20 MG/1
CAPSULE, DELAYED RELEASE ORAL
Qty: 180 CAPSULE | Refills: 3 | Status: SHIPPED | OUTPATIENT
Start: 2018-05-07 | End: 2019-03-14 | Stop reason: SDUPTHER

## 2019-01-30 ENCOUNTER — OFFICE VISIT (OUTPATIENT)
Dept: FAMILY MEDICINE | Facility: CLINIC | Age: 74
End: 2019-01-30
Payer: MEDICARE

## 2019-01-30 VITALS
TEMPERATURE: 98 F | BODY MASS INDEX: 27.99 KG/M2 | HEART RATE: 71 BPM | DIASTOLIC BLOOD PRESSURE: 80 MMHG | SYSTOLIC BLOOD PRESSURE: 129 MMHG | HEIGHT: 65 IN | WEIGHT: 168 LBS

## 2019-01-30 DIAGNOSIS — R07.9 CHEST PAIN SYNDROME: ICD-10-CM

## 2019-01-30 DIAGNOSIS — R06.09 DOE (DYSPNEA ON EXERTION): ICD-10-CM

## 2019-01-30 DIAGNOSIS — E78.2 COMBINED HYPERLIPIDEMIA ASSOCIATED WITH TYPE 2 DIABETES MELLITUS: ICD-10-CM

## 2019-01-30 DIAGNOSIS — G62.9 NEUROPATHY: ICD-10-CM

## 2019-01-30 DIAGNOSIS — N18.30 DIABETES MELLITUS WITH STAGE 3 CHRONIC KIDNEY DISEASE: Primary | ICD-10-CM

## 2019-01-30 DIAGNOSIS — E11.69 COMBINED HYPERLIPIDEMIA ASSOCIATED WITH TYPE 2 DIABETES MELLITUS: ICD-10-CM

## 2019-01-30 DIAGNOSIS — Z78.0 POSTMENOPAUSAL STATE: ICD-10-CM

## 2019-01-30 DIAGNOSIS — Z12.31 ENCOUNTER FOR SCREENING MAMMOGRAM FOR BREAST CANCER: ICD-10-CM

## 2019-01-30 DIAGNOSIS — K29.50 CHRONIC GASTRITIS, PRESENCE OF BLEEDING UNSPECIFIED, UNSPECIFIED GASTRITIS TYPE: ICD-10-CM

## 2019-01-30 DIAGNOSIS — E11.22 DIABETES MELLITUS WITH STAGE 3 CHRONIC KIDNEY DISEASE: Primary | ICD-10-CM

## 2019-01-30 DIAGNOSIS — G25.81 RLS (RESTLESS LEGS SYNDROME): ICD-10-CM

## 2019-01-30 DIAGNOSIS — Z78.9 STATIN INTOLERANCE: ICD-10-CM

## 2019-01-30 DIAGNOSIS — H40.9 GLAUCOMA, UNSPECIFIED GLAUCOMA TYPE, UNSPECIFIED LATERALITY: ICD-10-CM

## 2019-01-30 DIAGNOSIS — I15.2 HYPERTENSION ASSOCIATED WITH DIABETES: ICD-10-CM

## 2019-01-30 DIAGNOSIS — E11.59 HYPERTENSION ASSOCIATED WITH DIABETES: ICD-10-CM

## 2019-01-30 DIAGNOSIS — K44.9 HIATAL HERNIA: ICD-10-CM

## 2019-01-30 DIAGNOSIS — Z23 IMMUNIZATION DUE: ICD-10-CM

## 2019-01-30 DIAGNOSIS — D50.9 IRON DEFICIENCY ANEMIA, UNSPECIFIED IRON DEFICIENCY ANEMIA TYPE: ICD-10-CM

## 2019-01-30 PROCEDURE — 3079F PR MOST RECENT DIASTOLIC BLOOD PRESSURE 80-89 MM HG: ICD-10-PCS | Mod: S$GLB,,, | Performed by: NURSE PRACTITIONER

## 2019-01-30 PROCEDURE — 99999 PR PBB SHADOW E&M-EST. PATIENT-LVL V: ICD-10-PCS | Mod: PBBFAC,,, | Performed by: NURSE PRACTITIONER

## 2019-01-30 PROCEDURE — 3074F SYST BP LT 130 MM HG: CPT | Mod: S$GLB,,, | Performed by: NURSE PRACTITIONER

## 2019-01-30 PROCEDURE — G0439 PPPS, SUBSEQ VISIT: HCPCS | Mod: S$GLB,,, | Performed by: NURSE PRACTITIONER

## 2019-01-30 PROCEDURE — 99999 PR PBB SHADOW E&M-EST. PATIENT-LVL V: CPT | Mod: PBBFAC,,, | Performed by: NURSE PRACTITIONER

## 2019-01-30 PROCEDURE — G0439 PR MEDICARE ANNUAL WELLNESS SUBSEQUENT VISIT: ICD-10-PCS | Mod: S$GLB,,, | Performed by: NURSE PRACTITIONER

## 2019-01-30 PROCEDURE — G0008 FLU VACCINE - HIGH DOSE (65+) PRESERVATIVE FREE IM: ICD-10-PCS | Mod: S$GLB,,, | Performed by: NURSE PRACTITIONER

## 2019-01-30 PROCEDURE — 3079F DIAST BP 80-89 MM HG: CPT | Mod: S$GLB,,, | Performed by: NURSE PRACTITIONER

## 2019-01-30 PROCEDURE — 3074F PR MOST RECENT SYSTOLIC BLOOD PRESSURE < 130 MM HG: ICD-10-PCS | Mod: S$GLB,,, | Performed by: NURSE PRACTITIONER

## 2019-01-30 PROCEDURE — 90662 FLU VACCINE - HIGH DOSE (65+) PRESERVATIVE FREE IM: ICD-10-PCS | Mod: S$GLB,,, | Performed by: NURSE PRACTITIONER

## 2019-01-30 PROCEDURE — 90662 IIV NO PRSV INCREASED AG IM: CPT | Mod: S$GLB,,, | Performed by: NURSE PRACTITIONER

## 2019-01-30 PROCEDURE — G0008 ADMIN INFLUENZA VIRUS VAC: HCPCS | Mod: S$GLB,,, | Performed by: NURSE PRACTITIONER

## 2019-01-30 NOTE — PROGRESS NOTES
"Eunice Hutson presented for a  Medicare AWV and comprehensive Health Risk Assessment today. The following components were reviewed and updated:    · Medical history  · Family History  · Social history  · Allergies and Current Medications  · Health Risk Assessment  · Health Maintenance  · Care Team     ** See Completed Assessments for Annual Wellness Visit within the encounter summary.**       The following assessments were completed:  · Living Situation  · CAGE  · Depression Screening  · Timed Get Up and Go  · Whisper Test  · Cognitive Function Screening  · Nutrition Screening  · ADL Screening  · PAQ Screening    Vitals:    01/30/19 1119   BP: 129/80   Pulse: 71   Temp: 97.9 °F (36.6 °C)   TempSrc: Oral   Weight: 76.2 kg (168 lb)   Height: 5' 5" (1.651 m)     Body mass index is 27.96 kg/m².  Physical Exam   Constitutional: She is oriented to person, place, and time. She appears well-developed and well-nourished. No distress.   HENT:   Head: Normocephalic and atraumatic.   Eyes: EOM are normal. Pupils are equal, round, and reactive to light.   Neck: Normal range of motion. Neck supple.   Cardiovascular: Normal rate and regular rhythm.   Pulmonary/Chest: Effort normal and breath sounds normal.   Musculoskeletal: Normal range of motion.   Neurological: She is alert and oriented to person, place, and time.   Skin: Skin is warm and dry. No rash noted.   Psychiatric: She has a normal mood and affect. Judgment normal.   Nursing note and vitals reviewed.        Diagnoses and health risks identified today and associated recommendations/orders:    1. Diabetes mellitus with stage 3 chronic kidney disease  Stable- pt stopped medications, she was instructed to follow up for fasting labs and annual exam  - Ambulatory referral to Ophthalmology    2. Iron deficiency anemia, unspecified iron deficiency anemia type  Stable-healthy diet choices, routine labs    3. Combined hyperlipidemia associated with type 2 diabetes " mellitus  Stable-she is due for annual labs, encourage healthy diet choices    4. Hypertension associated with diabetes  Stable-blood pressure is well controlled, she discontinued all of her medications, will continue to monitor blood pressure and restart medications if appropriate    5. Glaucoma, unspecified glaucoma type, unspecified laterality  Stable-routine follow-up with Ophthalmology, she is due for diabetic eye exam    6. Neuropathy  Stable-follow up for worsening symptoms    7. RLS (restless legs syndrome)  Stable-follow up for worsening symptoms    8. Chronic gastritis, presence of bleeding unspecified, unspecified gastritis type  Stable-follow up for worsening symptoms    9. Hiatal hernia  Stable-follow up for worsening symptoms    10. Statin intolerance  Stable-avoid statins that cause symptoms    11. VARELA (dyspnea on exertion)  Stable-follow up for worsening symptoms    12. Chest pain syndrome  Stable-follow up for worsening symptoms    13. Immunization due    - Influenza - High Dose (65+) (PF) (IM)    14. Encounter for screening mammogram for breast cancer    - Mammo Digital Screening Bilat; Future    15. Postmenopausal state    - DXA Bone Density Spine And Hip; Future      Provided Eunice with a 5-10 year written screening schedule and personal prevention plan. Recommendations were developed using the USPSTF age appropriate recommendations. Education, counseling, and referrals were provided as needed. After Visit Summary printed and given to patient which includes a list of additional screenings\tests needed.    Follow-up if symptoms worsen or fail to improve, for Routine scheduled appointment, labs.    Mattie Zuñiga NP

## 2019-02-26 ENCOUNTER — TELEPHONE (OUTPATIENT)
Dept: FAMILY MEDICINE | Facility: CLINIC | Age: 74
End: 2019-02-26

## 2019-02-26 NOTE — TELEPHONE ENCOUNTER
----- Message from Tiffani Michele MA sent at 2/26/2019  2:44 PM CST -----  Pt called stating she was having chest pains, advised pt to go to the ER, states she does not have a way at this time. Advised pt to call [phone]

## 2019-02-28 ENCOUNTER — PATIENT OUTREACH (OUTPATIENT)
Dept: ADMINISTRATIVE | Facility: HOSPITAL | Age: 74
End: 2019-02-28

## 2019-02-28 LAB
CHOLEST SERPL-MSCNC: 235 MG/DL (ref 0–200)
HDL/CHOLESTEROL RATIO: 3.1
HDLC SERPL-MCNC: 75 MG/DL
LDLC SERPL CALC-MCNC: 148 MG/DL
TRIGL SERPL-MCNC: 58 MG/DL

## 2019-02-28 NOTE — LETTER
March 6, 2019        Eunice Hutson  [id number]  [facility] [address]      Dear Mrs. Hutson,    You have an upcoming appointment with Parminder Sahni MD on 3/14/19 @ 8:40 am.      Your chart is indicating you may be due for the following and I will be happy to assist you in scheduling any needed appointments:  Health Maintenance Due   Topic    Zoster Vaccine     Foot Exam     Hemoglobin A1c     Eye Exam      If you have had any of the above done at another facility, please bring the records or information with you so that your record at [facility] will be complete.    We will be happy to assist you with scheduling any necessary appointments or you may contact the [facility] appointment desk at [phone] to schedule at your convenience.   This information was sent to you via your patient portal.  Please check your message portal for important information.      Thank you for choosing [facility] for your healthcare needs,      ASH Jung  Care Coordination Department  [facility]  [phone]

## 2019-03-01 ENCOUNTER — OUTSIDE PLACE OF SERVICE (OUTPATIENT)
Dept: ADMINISTRATIVE | Facility: OTHER | Age: 74
End: 2019-03-01
Payer: MEDICARE

## 2019-03-01 PROCEDURE — 99222 PR INITIAL HOSPITAL CARE,LEVL II: ICD-10-PCS | Mod: ,,, | Performed by: THORACIC SURGERY (CARDIOTHORACIC VASCULAR SURGERY)

## 2019-03-01 PROCEDURE — 99222 1ST HOSP IP/OBS MODERATE 55: CPT | Mod: ,,, | Performed by: THORACIC SURGERY (CARDIOTHORACIC VASCULAR SURGERY)

## 2019-03-04 ENCOUNTER — OUTSIDE PLACE OF SERVICE (OUTPATIENT)
Dept: ADMINISTRATIVE | Facility: OTHER | Age: 74
End: 2019-03-04
Payer: MEDICARE

## 2019-03-04 PROCEDURE — 33508 ENDOSCOPIC VEIN HARVEST: CPT | Mod: ,,, | Performed by: THORACIC SURGERY (CARDIOTHORACIC VASCULAR SURGERY)

## 2019-03-04 PROCEDURE — 33519 PR CABG, ARTERY-VEIN, THREE: ICD-10-PCS | Mod: ,,, | Performed by: THORACIC SURGERY (CARDIOTHORACIC VASCULAR SURGERY)

## 2019-03-04 PROCEDURE — 33533 CABG ARTERIAL SINGLE: CPT | Mod: ,,, | Performed by: THORACIC SURGERY (CARDIOTHORACIC VASCULAR SURGERY)

## 2019-03-04 PROCEDURE — 33533 PR CABG, ARTERIAL, SINGLE: ICD-10-PCS | Mod: ,,, | Performed by: THORACIC SURGERY (CARDIOTHORACIC VASCULAR SURGERY)

## 2019-03-04 PROCEDURE — 33508 PR ENDOSCOPY W/VIDEO-ASST VEIN HARVEST,CABG: ICD-10-PCS | Mod: ,,, | Performed by: THORACIC SURGERY (CARDIOTHORACIC VASCULAR SURGERY)

## 2019-03-04 PROCEDURE — 33519 CABG ARTERY-VEIN THREE: CPT | Mod: ,,, | Performed by: THORACIC SURGERY (CARDIOTHORACIC VASCULAR SURGERY)

## 2019-03-12 ENCOUNTER — PATIENT OUTREACH (OUTPATIENT)
Dept: ADMINISTRATIVE | Facility: CLINIC | Age: 74
End: 2019-03-12

## 2019-03-12 RX ORDER — HYDROCODONE BITARTRATE AND ACETAMINOPHEN 5; 325 MG/1; MG/1
1 TABLET ORAL EVERY 8 HOURS PRN
COMMUNITY
End: 2019-05-28

## 2019-03-12 RX ORDER — METOPROLOL SUCCINATE 25 MG/1
25 TABLET, EXTENDED RELEASE ORAL DAILY
COMMUNITY
End: 2019-03-14 | Stop reason: SDUPTHER

## 2019-03-12 RX ORDER — ONDANSETRON 4 MG/1
TABLET, FILM COATED ORAL
Qty: 16 TABLET | Refills: 0 | Status: SHIPPED | OUTPATIENT
Start: 2019-03-12 | End: 2019-03-14 | Stop reason: SDUPTHER

## 2019-03-12 RX ORDER — ASPIRIN 325 MG
325 TABLET ORAL DAILY
COMMUNITY
End: 2019-06-18 | Stop reason: DRUGHIGH

## 2019-03-12 RX ORDER — ROSUVASTATIN CALCIUM 10 MG/1
10 TABLET, COATED ORAL DAILY
COMMUNITY
End: 2019-03-14 | Stop reason: SDUPTHER

## 2019-03-12 NOTE — PATIENT INSTRUCTIONS
After Coronary Artery Bypass Surgery  Your healthcare provider performed coronary artery bypass graft surgery (also called CABG, pronounced cabbage). This surgery created new pathways around blocked parts of your hearts blood vessels, allowing blood to reach your heart muscle. Your healthcare provider used a healthy blood vessel from another part of your body (a graft) to restore blood flow.  Activity  · Discuss with your healthcare doctor what you can and cant do as you recover. You will have good and bad days. This is normal. But tell your healthcare provider if you feel depressed, have trouble sleeping, or have a persistent decrease in appetite. Although these problems are common after surgery, they can slow your recovery. Its important to seek help.  · Let others drive you wherever you need to go for the first 3 to 6 weeks after your surgery.  · Ask someone to stand nearby while you shower or do other activities, just in case you need help.  · Avoid using very hot water while showering. It can affect your circulation and make you dizzy.  · Weigh yourself every day, at the same time of day, and in the same kind of clothes. Quick weight gain can be a sign of a problem that needs your healthcare providers attention.  · You may start doing light work around the house and yard after 2 to 3 weeks at home. Dont lift anything heavier than 5 pounds. Your healthcare provider may give you a more specific weight restriction. Until approved by your healthcare provider, avoid mowing the lawn, vacuuming, driving, and doing other activities that could strain your breastbone.  · Ask your healthcare provider when you can expect to return to work.  Pain relief  You will recover faster after surgery if your pain is kept under control:  · Dont be surprised if you feel sharp pains as your breastbone heals or if you have soreness in your incision during changes in weather.  · Tell your healthcare provider if you have  questions about what youre feeling, if your medicines dont reduce your pain, or if you suddenly feel worse.  Incision care  Healing takes several weeks. The bandage or dressing on your chest will likely be removed before you go home. If it is still in place, ask your healthcare provider how you should care for it after you return home. Do the following to care for your incision:  · If there are any steri-strips still on your incision, you can remove them after a week if they haven't already fallen off.  · Clean your incision every day with soap and water.  · Gently pat the area of the incision to dry it.  · Dont use any powders, lotions, or oils on your incision until it is well healed.  Lifestyle changes  · Ask your healthcare provider when you can start a walking program:  ¨ If you havent already started a walking program in the hospital, begin with short walks (about 5 minutes) at home. Go a little longer each day.  ¨ Choose a safe place with a level surface, such as a local park or mall.  ¨ Wear supportive shoes to prevent injury to your knees and ankles.  ¨ Walk with someone. Its more fun and helps you stay with it.  · Take your medicines exactly as directed. Dont skip doses.  · Maintain a healthy weight. Get help to lose any extra pounds.  · Avoid fatty and fried foods. Stick to lean meats, such as chicken or fish.   · Cut back on salt:  ¨ Limit canned, dried, packaged, and fast foods.  ¨ Dont add salt to your food at the table.  ¨ Season foods with herbs instead of salt when you cook.  · Break the smoking habit. Enroll in a stop-smoking program to improve your chances of success.  When to call your healthcare provider  Call your healthcare provider immediately if you have any of the following:  · Chest pain or a return of the heart symptoms you had before your surgery  · Fever of 100.4°F (38°C) or higher, or as directed by your healthcare provider  · Signs of infection (redness, swelling, drainage, or  warmth) at the incision site  · Shortness of breath  · Fainting  · Weight gain of more than 3 pounds in 1 day, more than 5 pounds in 1 week, or whatever weight gain you were told to report by your healthcare provider  · New or increased swelling in your hands, feet, or ankles  · Unrelieved pain at the incision site(s)  · Changes in the location, type, or severity of pain  · Fast or irregular pulse  · Persistent abdominal pain  · Nausea  · Trouble urinating  · Any unusual bleeding   Date Last Reviewed: 10/1/2016  © 2632-1833 [employer]. [address] [place] [address]. All rights reserved. This information is not intended as a substitute for professional medical care. Always follow your healthcare professional's instructions.

## 2019-03-14 ENCOUNTER — OFFICE VISIT (OUTPATIENT)
Dept: FAMILY MEDICINE | Facility: CLINIC | Age: 74
End: 2019-03-14
Payer: MEDICARE

## 2019-03-14 ENCOUNTER — HOSPITAL ENCOUNTER (OUTPATIENT)
Dept: RADIOLOGY | Facility: HOSPITAL | Age: 74
Discharge: HOME | End: 2019-03-14
Attending: NURSE PRACTITIONER
Payer: MEDICARE

## 2019-03-14 VITALS
BODY MASS INDEX: 27.96 KG/M2 | TEMPERATURE: 98 F | HEART RATE: 75 BPM | SYSTOLIC BLOOD PRESSURE: 113 MMHG | DIASTOLIC BLOOD PRESSURE: 55 MMHG | HEIGHT: 65 IN

## 2019-03-14 DIAGNOSIS — Z91.148 NONCOMPLIANCE WITH MEDICATION TREATMENT DUE TO UNDERUSE OF MEDICATION: ICD-10-CM

## 2019-03-14 DIAGNOSIS — I15.2 HYPERTENSION ASSOCIATED WITH DIABETES: ICD-10-CM

## 2019-03-14 DIAGNOSIS — E78.2 COMBINED HYPERLIPIDEMIA ASSOCIATED WITH TYPE 2 DIABETES MELLITUS: ICD-10-CM

## 2019-03-14 DIAGNOSIS — E11.69 COMBINED HYPERLIPIDEMIA ASSOCIATED WITH TYPE 2 DIABETES MELLITUS: ICD-10-CM

## 2019-03-14 DIAGNOSIS — E11.22 DIABETES MELLITUS WITH STAGE 3 CHRONIC KIDNEY DISEASE: ICD-10-CM

## 2019-03-14 DIAGNOSIS — Z12.31 ENCOUNTER FOR SCREENING MAMMOGRAM FOR BREAST CANCER: ICD-10-CM

## 2019-03-14 DIAGNOSIS — Z78.0 POSTMENOPAUSAL STATE: ICD-10-CM

## 2019-03-14 DIAGNOSIS — E11.59 HYPERTENSION ASSOCIATED WITH DIABETES: ICD-10-CM

## 2019-03-14 DIAGNOSIS — I25.119 CORONARY ARTERY DISEASE INVOLVING NATIVE CORONARY ARTERY OF NATIVE HEART WITH ANGINA PECTORIS: Primary | ICD-10-CM

## 2019-03-14 DIAGNOSIS — N18.30 DIABETES MELLITUS WITH STAGE 3 CHRONIC KIDNEY DISEASE: ICD-10-CM

## 2019-03-14 DIAGNOSIS — Z95.1 S/P CABG X 4: ICD-10-CM

## 2019-03-14 PROBLEM — I25.110 CORONARY ARTERY DISEASE INVOLVING NATIVE CORONARY ARTERY OF NATIVE HEART WITH UNSTABLE ANGINA PECTORIS: Status: ACTIVE | Noted: 2019-03-01

## 2019-03-14 PROBLEM — R07.9 CHEST PAIN SYNDROME: Status: RESOLVED | Noted: 2017-07-13 | Resolved: 2019-03-14

## 2019-03-14 PROBLEM — I21.4 NSTEMI (NON-ST ELEVATED MYOCARDIAL INFARCTION): Status: RESOLVED | Noted: 2018-01-07 | Resolved: 2019-03-14

## 2019-03-14 PROBLEM — R06.09 DOE (DYSPNEA ON EXERTION): Status: RESOLVED | Noted: 2017-07-13 | Resolved: 2019-03-14

## 2019-03-14 PROBLEM — I21.4 NSTEMI (NON-ST ELEVATED MYOCARDIAL INFARCTION): Status: ACTIVE | Noted: 2018-01-07

## 2019-03-14 PROCEDURE — 77080 DXA BONE DENSITY AXIAL: CPT | Mod: TC,PO

## 2019-03-14 PROCEDURE — 77080 DEXA BONE DENSITY SPINE HIP: ICD-10-PCS | Mod: 26,,, | Performed by: RADIOLOGY

## 2019-03-14 PROCEDURE — 99496 TRANSJ CARE MGMT HIGH F2F 7D: CPT | Mod: S$GLB,,, | Performed by: FAMILY MEDICINE

## 2019-03-14 PROCEDURE — 77080 DXA BONE DENSITY AXIAL: CPT | Mod: 26,,, | Performed by: RADIOLOGY

## 2019-03-14 PROCEDURE — 99496 TRANSITIONAL CARE MANAGE SERVICE 7 DAY DISCHARGE: ICD-10-PCS | Mod: S$GLB,,, | Performed by: FAMILY MEDICINE

## 2019-03-14 PROCEDURE — 99999 PR PBB SHADOW E&M-EST. PATIENT-LVL III: CPT | Mod: PBBFAC,,, | Performed by: FAMILY MEDICINE

## 2019-03-14 PROCEDURE — 99999 PR PBB SHADOW E&M-EST. PATIENT-LVL III: ICD-10-PCS | Mod: PBBFAC,,, | Performed by: FAMILY MEDICINE

## 2019-03-14 RX ORDER — NITROGLYCERIN 0.4 MG/1
0.4 TABLET SUBLINGUAL
COMMUNITY
Start: 2019-03-09

## 2019-03-14 RX ORDER — ACETAMINOPHEN 325 MG/1
650 TABLET ORAL
COMMUNITY
Start: 2019-03-09

## 2019-03-14 RX ORDER — ONDANSETRON 4 MG/1
TABLET, FILM COATED ORAL
Qty: 30 TABLET | Refills: 0 | Status: SHIPPED | OUTPATIENT
Start: 2019-03-14 | End: 2019-05-28 | Stop reason: SDUPTHER

## 2019-03-14 RX ORDER — DEXTROSE 4 G
TABLET,CHEWABLE ORAL
Qty: 1 EACH | Status: SHIPPED | OUTPATIENT
Start: 2019-03-14 | End: 2019-03-19 | Stop reason: SDUPTHER

## 2019-03-14 RX ORDER — LISINOPRIL 5 MG/1
5 TABLET ORAL DAILY
Qty: 90 TABLET | Refills: 1 | Status: SHIPPED | OUTPATIENT
Start: 2019-03-14 | End: 2019-06-18 | Stop reason: SDUPTHER

## 2019-03-14 RX ORDER — GLIMEPIRIDE 1 MG/1
1 TABLET ORAL
Qty: 90 TABLET | Refills: 1 | Status: SHIPPED | OUTPATIENT
Start: 2019-03-14 | End: 2019-10-22 | Stop reason: SDUPTHER

## 2019-03-14 RX ORDER — METOPROLOL SUCCINATE 25 MG/1
25 TABLET, EXTENDED RELEASE ORAL DAILY
Qty: 90 TABLET | Refills: 1 | Status: SHIPPED | OUTPATIENT
Start: 2019-03-14 | End: 2019-06-18 | Stop reason: SDUPTHER

## 2019-03-14 RX ORDER — ROSUVASTATIN CALCIUM 10 MG/1
10 TABLET, COATED ORAL DAILY
Qty: 90 TABLET | Refills: 1 | Status: SHIPPED | OUTPATIENT
Start: 2019-03-14 | End: 2019-10-22 | Stop reason: SDUPTHER

## 2019-03-14 RX ORDER — OMEPRAZOLE 20 MG/1
40 CAPSULE, DELAYED RELEASE ORAL DAILY
Qty: 180 CAPSULE | Refills: 1 | Status: SHIPPED | OUTPATIENT
Start: 2019-03-14 | End: 2019-04-03 | Stop reason: CLARIF

## 2019-03-14 RX ORDER — LANCETS
EACH MISCELLANEOUS
Qty: 100 EACH | Status: SHIPPED | OUTPATIENT
Start: 2019-03-14 | End: 2019-03-19 | Stop reason: SDUPTHER

## 2019-03-14 RX ORDER — GABAPENTIN 300 MG/1
600 CAPSULE ORAL NIGHTLY
Qty: 180 CAPSULE | Refills: 1 | Status: SHIPPED | OUTPATIENT
Start: 2019-03-14 | End: 2019-10-22 | Stop reason: SDUPTHER

## 2019-03-14 NOTE — PROGRESS NOTES
Follow-up hospitalization as below with NSTEMI and coronary arteries disease.  She ended up having coronary bypass.  She is still feeling somewhat better, but still weak.  She has follow-up scheduled Cardiology and cardiothoracic surgery.  She had not been compliant with her diabetic medications.    Diabetes Management Status    Statin: Taking  ACE/ARB: Taking    Screening or Prevention Patient's value Goal Complete/Controlled?   HgA1C Testing and Control   Lab Results   Component Value Date    HGBA1C 7.8 (H) 01/15/2018      Annually/Less than 8% No   Lipid profile : 02/28/2019 Annually No   LDL control Lab Results   Component Value Date    LDLCALC 157.4 07/06/2017    Annually/Less than 100 mg/dl  No   Nephropathy screening Lab Results   Component Value Date    LABMICR 6.0 07/06/2017     Lab Results   Component Value Date    PROTEINUA SEE COMMENT 06/02/2017    Annually No   Blood pressure BP Readings from Last 1 Encounters:   03/14/19 (!) 113/55    Less than 140/90 Yes   Dilated retinal exam : 02/15/2018 Annually No   Foot exam   : 03/14/2019 Annually Yes         Jovanni Mera MD - 03/09/2019 12:50 PM CST  Formatting of this note might be different from the original.  [facility] DISCHARGE SUMMARY  Patient ID:  Eunice Hutson  [id number]  74 y.o.  1945    Admit Date:   2/27/2019 7:59 AM    Discharge Date:   Prior Discharge Date: 3/9/2019 2:48 PM    Admitting Physician:  Aron Stewart MD    Discharge Physician:   JOVANIN MERA MD    Consults:  Consultants   Provider Service Role Specialty   Elvis Gale MD Cardiothoracic Surgery Consulting Physician Cardiothoracic Surgery   Juan Red MD Cardiology Consulting Physician Cardiovascular Disease       Reason for Admission/Admission Diagnoses:   Present on Admission:   NSTEMI (non-ST elevated myocardial infarction) (HCC)   CKD (chronic kidney disease) stage 3, GFR 30-59 ml/min (HCC)   Hypertension associated with diabetes  ([facility])   DM type 2 (diabetes mellitus, type 2) ([facility])   Coronary artery disease involving native coronary artery of native heart with unstable angina pectoris ([facility])   Unstable angina ([facility])    Discharge Diagnoses:   Active Hospital Problems   Diagnosis Date Noted    S/P CABG x 4 03/05/2019    Coronary artery disease involving native coronary artery of native heart with unstable angina pectoris ([facility]) 03/01/2019    Unstable angina ([facility]) 03/01/2019    Elevated troponin    NSTEMI (non-ST elevated myocardial infarction) ([facility]) 01/07/2018    CKD (chronic kidney disease) stage 3, GFR 30-59 ml/min ([facility]) 01/07/2018   Chronic    Hypertension associated with diabetes ([facility]) 10/23/2012   Chronic    DM type 2 (diabetes mellitus, type 2) ([facility]) 02/27/2012   Chronic     Resolved Hospital Problems     History of Present Illness:   Patient information was obtained from patient and review of EMR chart. Patient is a 74 y.o. female admitted to Hospitalist Service from [facility] Emergency Room with complaint of chest pain and shortness of breath.     Patient stated that for the last 2 days and since she came back from a trip in [place] and developed constant, moderate severe chest pressure/tightness, substernal with radiation to the shoulders and upper extremities. Associated weakness, fatigue as well as nausea and shortness of breath with the pain. Never had similar symptoms in the past. Denies history of CAD. No fever or chills, abdominal pain, orthopnea, or nocturnal dyspnea. Patient developed the chest pain even with exertion and at rest.    Hospital Course and Treatment:    NSTEMI; Multivessel CAD s/p CABG  - troponin peaked at 0.97  - stress test 1/7/18 negative for any ischemic changes  - [facility] 3/1/9 showed multivessel coronary disease  - s/p CABG 3/4/19  - cont ASA, statin, metoprolol     Hypotension (resolved)  - likely due to hypovolemia from blood loss  - s/p transfusion prbc. required levophed briefly.    - Last /65 prior to discharge.     Anemia of blood loss  - expected from surgery. s/p prbc transfusions  - H/H 10.1/29.3 prior to discharge.     NIDDM   - treated with SSI. Sitagliptin and metformin resumed prior to discharge     Chronic kidney disease stage III  - renal function remained stable     Thrombocytopenia (resolved)     Hyponatremia (resovled)     Admission Information   Date & Time  2/27/2019 Provider  Pete Guadarrama MD [facility] Phone  [phone]       I discussed with the patient and the family disease process and treatment.     I have personally seen and examined the patient, Eunice Hutson, in a face to face encounter on the date of discharge.     She is cleared for discharge with instructions to follow up as directed.   Total time in the care and discharge planning of this patient was greater than 30 minutes.    Past Medical History:  Past Medical History:   Diagnosis Date    Anemia     Blood transfusion     Chronic gastritis     Coronary artery disease involving native coronary artery of native heart with angina pectoris 3/1/2019    Depression     Diabetes mellitus with stage 3 chronic kidney disease     Diverticulosis     DM type 2 (diabetes mellitus, type 2) 2/27/2012    GERD (gastroesophageal reflux disease)     Glaucoma (increased eye pressure)     Hiatal hernia     Hyperlipidemia LDL goal < 100 2/27/2012    Hypertension goal BP (blood pressure) < 130/80 2/27/2012    NSTEMI (non-ST elevated myocardial infarction) 1/7/2018    RLS (restless legs syndrome)     Statin intolerance 1/15/2018     Past Surgical History:   Procedure Laterality Date    ARTHROSCOPY, SHOULDER Right 6/19/2012    [facility]    BACK SURGERY  01/05/2011    BREAST BIOPSY      benign    COLONOSCOPY  11/16/2011    COLONOSCOPY  1/24/1013    ESOPHAGOGASTRODUODENOSCOPY  11/17/2011     "ESOPHAGOGASTRODUODENOSCOPY  1/16/2013,2/29/16    EYE SURGERY      FOOT SURGERY       derotational arthroplasty of the fifth digit right foot     HEMORRHOID SURGERY      REPAIR, ROTATOR CUFF, ARTHROSCOPIC Right 6/19/2012    [facility]    SHOULDER ARTHROSCOPY      TUBAL LIGATION      VAGINAL DELIVERY      times 2     Social History     Socioeconomic History    Marital status:      Spouse name: Not on file    Number of children: Not on file    Years of education: Not on file    Highest education level: Not on file   Social Needs    Financial resource strain: Not on file    Food insecurity - worry: Not on file    Food insecurity - inability: Not on file    Transportation needs - medical: Not on file    Transportation needs - non-medical: Not on file   Occupational History    Occupation: retired accounting   Tobacco Use    Smoking status: Never Smoker    Smokeless tobacco: Never Used   Substance and Sexual Activity    Alcohol use: No    Drug use: No    Sexual activity: No   Other Topics Concern    Not on file   Social History Narrative    Not on file     Family History   Problem Relation Age of Onset    Heart disease Mother     Prostate cancer Father     Lung cancer Father     Ovarian cancer Sister     Colon cancer Brother 43    Cancer Brother         Colon    Kidney disease Brother         Kidney transplant    Diabetes Sister      Review of patient's allergies indicates:   Allergen Reactions    Atorvastatin Other (See Comments)     Other reaction(s): Other (See Comments)  Leg cramps  Leg cramps  Leg cramps    Caffeine Other (See Comments)     Other reaction(s): Other (See Comments)  Makes hyper  "Makes me high"  Makes hyper  Makes hyper  "Makes me high"    Codeine Itching     Other reaction(s): Rash  Able to take Lortab  Took a percocet  Other reaction(s): Rash  Able to take Lortab  Other reaction(s): Rash  Able to take Lortab  Took a percocet "    Pravastatin Other (See Comments)     Other reaction(s): Other (See Comments)  Leg cramps  Leg cramps  Leg cramps    Pregabalin Hallucinations and Other (See Comments)     Other reaction(s): Other (See Comments)    Sulfamethoxazole-trimethoprim Other (See Comments)     Other reaction(s): Other (See Comments)     Current Outpatient Medications on File Prior to Visit   Medication Sig Dispense Refill    acetaminophen (TYLENOL) 325 MG tablet Take 650 mg by mouth.      aspirin 325 MG tablet Take 325 mg by mouth once daily.      docusate calcium (SURFAK) 240 mg capsule Take 240 mg by mouth daily as needed.       HYDROcodone-acetaminophen (NORCO) 5-325 mg per tablet Take 1 tablet by mouth every 8 (eight) hours as needed for Pain.      nitroGLYCERIN (NITROSTAT) 0.4 MG SL tablet Place 0.4 mg under the tongue.      ropinirole (REQUIP) 1 MG tablet TAKE ONE TABLET BY MOUTH NIGHTLY 90 tablet 3    [DISCONTINUED] gabapentin (NEURONTIN) 300 MG capsule TAKE ONE CAPSULE BY MOUTH TWICE DAILY 180 capsule 0    [DISCONTINUED] glimepiride (AMARYL) 2 MG tablet Take 1 mg by mouth before breakfast.       [DISCONTINUED] metoprolol succinate (TOPROL-XL) 25 MG 24 hr tablet Take 25 mg by mouth once daily.      [DISCONTINUED] omeprazole (PRILOSEC) 20 MG capsule TAKE TWO CAPSULES BY MOUTH EVERY  capsule 3    [DISCONTINUED] ondansetron (ZOFRAN) 4 MG tablet Take 1 tablet (4 mg total) by mouth every 8 hours as needed for Nausea. 16 tablet 0    [DISCONTINUED] rosuvastatin (CRESTOR) 10 MG tablet Take 10 mg by mouth once daily.      [DISCONTINUED] SITagliptan-metformin (JANUMET) 50-1,000 mg per tablet Take 1 tablet by mouth 2 (two) times daily with meals. 180 tablet 1    [DISCONTINUED] cyclobenzaprine (FLEXERIL) 5 MG tablet Take 1 tablet (5 mg total) by mouth 3 (three) times daily as needed for Muscle spasms. 30 tablet 0    [DISCONTINUED] latanoprost (XALATAN) 0.005 % ophthalmic solution Place 1 drop into both eyes every  "evening.        [DISCONTINUED] lisinopril (PRINIVIL,ZESTRIL) 20 MG tablet TAKE ONE TABLET BY MOUTH EVERY DAY 90 tablet 3    [DISCONTINUED] polyethylene glycol (GLYCOLAX) 17 gram/dose powder Take 17 g by mouth once daily. Mix in water or juice 510 g prn    [DISCONTINUED] PROCTOZONE-HC 2.5 % rectal cream PLACE RECTALLY TWICE DAILY (Patient taking differently: PLACE RECTALLY TWICE DAILY as needed) 30 g 1    [DISCONTINUED] traMADol (ULTRAM) 50 mg tablet Take 1 tablet (50 mg total) by mouth every 8 (eight) hours as needed. 20 tablet 0    [DISCONTINUED] triamcinolone acetonide 0.1% (KENALOG) 0.1 % cream Apply topically 2 (two) times daily. (Patient taking differently: Apply topically 2 (two) times daily. As needed) 30 g 1     No current facility-administered medications on file prior to visit.            ROS:  GENERAL: No fever, chills,  or significant weight changes.   CARDIOVASCULAR: Denies exertional chest pain. Still discomfort from surgery.  ABDOMEN: Denies diarrhea, abdominal pain, hematemesis or blood in stool.  Positive chronic constipation  URINARY: No flank pain, dysuria or hematuria.    OBJECTIVE:   Vitals:    03/14/19 0821   BP: (!) 113/55   Pulse: 75   Temp: 98 °F (36.7 °C)   Height: 5' 5" (1.651 m)     Wt Readings from Last 3 Encounters:   01/30/19 76.2 kg (168 lb)   02/21/18 79.8 kg (175 lb 14.8 oz)   01/15/18 80.1 kg (176 lb 9.4 oz)       APPEARANCE: Well nourished, well developed, in no acute distress.   HEAD: Normocephalic. Atraumatic. No sinus tenderness.   EYES:   Right eye: Pupil reactive. Conjunctiva clear.   Left eye: Pupil reactive. Conjunctiva clear.   Both fundi: Grossly normal to nondilated exam. EOMI.   EARS: TM's intact. Light reflex normal. No retraction or perforation.   NOSE: clear.   MOUTH & THROAT: No pharyngeal erythema or exudate. No lesions.   NECK: No bruits. No JVD. No cervical lymphadenopathy. No thyromegaly.   CHEST: Breath sounds clear bilaterally. Normal respiratory effort . "  Sternotomy wound appears to be healing well.  CARDIOVASCULAR: Normal rate. Regular rhythm. No murmurs. No rub. No gallops.   ABDOMEN: Bowel sounds normal. Soft. No tenderness. No organomegaly.   PERIPHERAL VASCULAR: No cyanosis. No clubbing. No edema.   NEUROLOGIC: No focal findings.    Feet:Sensation in the feet intact to monofilament testing.   No significant foot lesions.Pulses palpable.  MENTAL STATUS: Alert. Oriented x 3.       Eunice was seen today for transitional care.    Diagnoses and all orders for this visit:    Coronary artery disease involving native coronary artery of native heart with angina pectoris    S/P CABG x 4    Hypertension associated with diabetes  -     Hemoglobin A1c; Future  -     CBC auto differential; Future  -     Comprehensive metabolic panel; Future  -     Microalbumin/creatinine urine ratio; Future  -     Ambulatory referral to Optometry    Combined hyperlipidemia associated with type 2 diabetes mellitus    Diabetes mellitus with stage 3 chronic kidney disease    Noncompliance with medication treatment due to underuse of medication    Other orders  -     blood sugar diagnostic (BLOOD GLUCOSE TEST) Strp; Test glucose 1 x daily  -     blood-glucose meter Misc; Use as directed  -     lancets Misc; As directed  -     glimepiride (AMARYL) 1 MG tablet; Take 1 tablet (1 mg total) by mouth daily with breakfast.  -     metoprolol succinate (TOPROL-XL) 25 MG 24 hr tablet; Take 1 tablet (25 mg total) by mouth once daily.  -     lisinopril (PRINIVIL,ZESTRIL) 5 MG tablet; Take 1 tablet (5 mg total) by mouth once daily.  -     omeprazole (PRILOSEC) 20 MG capsule; Take 2 capsules (40 mg total) by mouth once daily.  -     rosuvastatin (CRESTOR) 10 MG tablet; Take 1 tablet (10 mg total) by mouth once daily.  -     SITagliptan-metformin (JANUMET) 50-1,000 mg per tablet; Take 1 tablet by mouth 2 (two) times daily with meals.  -     gabapentin (NEURONTIN) 300 MG capsule; Take 2 capsules (600 mg total)  by mouth every evening.  -     ondansetron (ZOFRAN) 4 MG tablet; Take 1 tablet (4 mg total) by mouth every 8 hours as needed for Nausea.      Check hemoglobin A1c, CBC, CMP, urine microalbumin today.  Keep follow-up scheduled Cardiology and Cardiothoracic surgery.  She just started statin, will need recheck in about 3 months.  Follow-up appointment with me in 1 month.  Compliance encouraged with medication.    Transitional Care Note    Family and/or Caretaker present at visit?  No.  Diagnostic tests reviewed/disposition: No diagnosic tests pending after this hospitalization.  Disease/illness education:  Yes  Home health/community services discussion/referrals: Patient has home health established at [facility].   Establishment or re-establishment of referral orders for community resources: No other necessary community resources.   Discussion with other health care providers: No discussion with other health care providers necessary.

## 2019-03-18 ENCOUNTER — TELEPHONE (OUTPATIENT)
Dept: FAMILY MEDICINE | Facility: CLINIC | Age: 74
End: 2019-03-18

## 2019-03-18 DIAGNOSIS — N28.9 FUNCTION KIDNEY DECREASED: Primary | ICD-10-CM

## 2019-03-18 NOTE — TELEPHONE ENCOUNTER
----- Message from Viktor Somers sent at 3/18/2019  4:13 PM CDT -----  Contact: Kwanji [phone] ref# [id number]  .Type:  Diabetic/Medical Supplies Request    Name of Caller:Martha  What supplies are needed:Lancets/Meter  What is the brand of the supplies:One Touch Ultra 2  Refill or New Rx:refil  If checking glucose, how many times do they check it?:  Who prescribed the original supplies:Dr. Sahni  Pharmacy/Company Name, Phone #, Location:.  [facility] - [address] [address]  Phone: [phone] Fax: [phone]    Phone: [phone] Fax: [phone]    [facility] - [address] [address]  Phone: [phone] Fax: [phone]      Requesting a Call Back?:no   Would the patient rather a call back or a response via [facility]?   Best Call Back Number:[phone] ref#[id number]  Additional Information:

## 2019-03-18 NOTE — TELEPHONE ENCOUNTER
----- Message from Cher Jones sent at 3/18/2019  1:00 PM CDT -----  Contact: self [phone]  States that she needs an order for a glucose monitor and test strips called to [facility] at  [phone]. States that they need more info. Please call back at [phone]//thank you acc

## 2019-03-18 NOTE — TELEPHONE ENCOUNTER
Notes recorded by Parminder Sahni MD on 3/18/2019 at 12:02 PM CDT  Hemoglobin A1c elevated.  Sugar test elevated.  Potassium elevated.  Kidney function decreased, but similar to previous checks.  Anemia improving from hospitalization.  Recommend continue current medication.  Make sure taking all medications as prescribed.  Keep scheduled follow-up appointment next month.  Please add hemoglobin A1c, BMP, CBC to the laboratory she has scheduled in June. My nurse will contact you to arrange.  Thanks,  Dr. Sahni

## 2019-03-19 RX ORDER — INSULIN PUMP SYRINGE, 3 ML
EACH MISCELLANEOUS
Qty: 1 EACH | Refills: 0 | Status: SHIPPED | OUTPATIENT
Start: 2019-03-19 | End: 2020-03-18

## 2019-03-19 RX ORDER — LANCETS
EACH MISCELLANEOUS
Qty: 100 EACH | Refills: 3 | Status: SHIPPED | OUTPATIENT
Start: 2019-03-19 | End: 2019-04-02

## 2019-03-19 RX ORDER — ROPINIROLE 1 MG/1
1 TABLET, FILM COATED ORAL NIGHTLY
Qty: 90 TABLET | Refills: 3 | Status: SHIPPED | OUTPATIENT
Start: 2019-03-19

## 2019-03-19 NOTE — TELEPHONE ENCOUNTER
----- Message from Denia Alonso sent at 3/19/2019  1:24 PM CDT -----  Contact: Travis  Requesting a call back regarding prescription for glucose testing system(meter,strips, lancets). Need prescription for each. Please call back at [phone]. Ref# [id number]    Thanks,  Denia Alonso

## 2019-03-20 ENCOUNTER — TELEPHONE (OUTPATIENT)
Dept: FAMILY MEDICINE | Facility: CLINIC | Age: 74
End: 2019-03-20

## 2019-03-20 NOTE — TELEPHONE ENCOUNTER
----- Message from Cher Jones sent at 3/20/2019 12:18 PM CDT -----  Contact: Shanice [phone] ext [phone]  States that she is calling to confirm that the fax was received. Please call back at [phone] ext [phone]//thank you acc

## 2019-03-20 NOTE — TELEPHONE ENCOUNTER
----- Message from Patrizia Gentile sent at 3/20/2019 11:17 AM CDT -----  Contact: Keila with [facility]   Keila called and stated she is returning a call to the nurse. She can be reached at [phone].    Thanks,  TF

## 2019-03-20 NOTE — TELEPHONE ENCOUNTER
----- Message from Asmita Adams sent at 3/20/2019 10:37 AM CDT -----  Contact: Marii Marin is calling to speak with nurse in regards to fax for pt. Pt filed a grievance and Tania faxed documents on yesterday and is calling to make sure documents were received and checking status. Please call Tania back at [phone].      Thanks,   Asmita Adams

## 2019-03-20 NOTE — TELEPHONE ENCOUNTER
Spoke to [facility] staff earlier, did not call again, George informed, he verified that ERXs were received,

## 2019-03-20 NOTE — TELEPHONE ENCOUNTER
Patient informed that [facility] was called and they verified that they do have the prescriptions sent by Dr Sahni.

## 2019-03-20 NOTE — TELEPHONE ENCOUNTER
Spoke to staff at [facility] and they have received the prescriptions that were sent via [facility] on 3/19/19

## 2019-03-22 ENCOUNTER — TELEPHONE (OUTPATIENT)
Dept: FAMILY MEDICINE | Facility: CLINIC | Age: 74
End: 2019-03-22

## 2019-03-22 NOTE — TELEPHONE ENCOUNTER
----- Message from Armida Ojeda sent at 3/22/2019  8:45 AM CDT -----  Contact: Ashli saleem/[facility]   .Type:  Pharmacy Calling to Clarify an RX    Name of Caller: pt  Pharmacy Name: [facility]   Prescription Name: lancets and one touch meter   What do they need to clarify?: pt needs refill  Best Call Back Number: [phone] ref# [id number]   Additional Information:  Can e-scribed prescription

## 2019-04-02 ENCOUNTER — OFFICE VISIT (OUTPATIENT)
Dept: VASCULAR SURGERY | Facility: CLINIC | Age: 74
End: 2019-04-02
Payer: MEDICARE

## 2019-04-02 VITALS
HEART RATE: 77 BPM | SYSTOLIC BLOOD PRESSURE: 121 MMHG | WEIGHT: 171.63 LBS | DIASTOLIC BLOOD PRESSURE: 65 MMHG | HEIGHT: 65 IN | BODY MASS INDEX: 28.6 KG/M2

## 2019-04-02 DIAGNOSIS — Z95.1 S/P CABG X 4: Primary | ICD-10-CM

## 2019-04-02 PROCEDURE — 99024 PR POST-OP FOLLOW-UP VISIT: ICD-10-PCS | Mod: S$GLB,,, | Performed by: THORACIC SURGERY (CARDIOTHORACIC VASCULAR SURGERY)

## 2019-04-02 PROCEDURE — 99024 POSTOP FOLLOW-UP VISIT: CPT | Mod: S$GLB,,, | Performed by: THORACIC SURGERY (CARDIOTHORACIC VASCULAR SURGERY)

## 2019-04-02 PROCEDURE — 99999 PR PBB SHADOW E&M-EST. PATIENT-LVL III: CPT | Mod: PBBFAC,,, | Performed by: THORACIC SURGERY (CARDIOTHORACIC VASCULAR SURGERY)

## 2019-04-02 PROCEDURE — 99999 PR PBB SHADOW E&M-EST. PATIENT-LVL III: ICD-10-PCS | Mod: PBBFAC,,, | Performed by: THORACIC SURGERY (CARDIOTHORACIC VASCULAR SURGERY)

## 2019-04-02 RX ORDER — TRAMADOL HYDROCHLORIDE 50 MG/1
50 TABLET ORAL EVERY 6 HOURS PRN
COMMUNITY
End: 2019-05-28

## 2019-04-02 NOTE — PROGRESS NOTES
"Subjective:       Eunice Hutson presents to the clinic after undergoing coronary artery bypass x4 with left internal thoracic artery to left anterior descending and reverse saphenous vein graft to an obtuse marginal branch, the right coronary artery, and a diagonal branch at [facility] on 03/04/2019.  She has done quite well since surgery. Her chest tightness has resolved.  She has been performing activities within restrictions without difficulty.       Objective:      /65   Pulse 77   Ht 5' 5" (1.651 m)   Wt 77.8 kg (171 lb 9.6 oz)   BMI 28.56 kg/m²     Objective  General:  She appears well.  Chest:  Sternum is stable.  Sternotomy incisions are healed.  Sutures at the mediastinal drain sites removed.  Lungs:  Clear bilaterally.  Heart:  Regular rate and rhythm. No rubs or murmurs.  Extremities:  Left leg incision is healing well       Assessment:      Doing well postoperatively.      Plan:      1. Continue any current medications.  2. Wound care discussed.  3. Pt is to increase activities as tolerated.  4. Follow up: 8 weeks    "

## 2019-04-03 RX ORDER — PANTOPRAZOLE SODIUM 40 MG/1
40 TABLET, DELAYED RELEASE ORAL DAILY
Qty: 90 TABLET | Refills: 3 | Status: SHIPPED | OUTPATIENT
Start: 2019-04-03

## 2019-04-11 ENCOUNTER — TELEPHONE (OUTPATIENT)
Dept: FAMILY MEDICINE | Facility: CLINIC | Age: 74
End: 2019-04-11

## 2019-04-11 NOTE — TELEPHONE ENCOUNTER
----- Message from Rad Witt sent at 4/11/2019  1:42 PM CDT -----  Contact: [facility]- [phone]  Pt is currently taking Prolciax 20 mg twice daily.  Pt has a new Rx for protonics 40 mg to take daily.  Would like to know if both medications should be taken.  Please call back at [phone].  x-

## 2019-04-11 NOTE — TELEPHONE ENCOUNTER
----- Message from Tania Leija sent at 4/11/2019  1:18 PM CDT -----  Contact: [facility]  Ms Catherine needs to speak to nurse about patients Janumet 50-1000mg, taking one 2 times daily with meals, patient is unable to afford that medication and wants to know if that can be changed to an alternate medication. Ms Catherine did speak to insurance and other types are just as expensive. Medication for a 90 day supply is $140.00, please call Ms Catherine back at [phone] and advise. Thank you

## 2019-04-11 NOTE — TELEPHONE ENCOUNTER
Informed Claritza Sahni is out of office today, and will address message tomorrow when he returns.

## 2019-04-12 RX ORDER — METFORMIN HYDROCHLORIDE 500 MG/1
1000 TABLET, EXTENDED RELEASE ORAL 2 TIMES DAILY
Qty: 180 TABLET | Refills: 1 | Status: SHIPPED | OUTPATIENT
Start: 2019-04-12 | End: 2019-10-22 | Stop reason: SDUPTHER

## 2019-04-12 NOTE — TELEPHONE ENCOUNTER
Dorene informed prilosecd bid was stopped as insurance would not cover, pharmacy had contacted office so MD changed to Protonix, will call her back once Dr Sahni informs about diabetic meds.  She will let patient know.

## 2019-04-12 NOTE — TELEPHONE ENCOUNTER
Let us have her go back to plain metformin  mg take 2 tablets twice a day.  She had not been taking all of her medications as prescribed prior to the last blood work, so hopefully with her taking the medication better it will work better.

## 2019-04-12 NOTE — TELEPHONE ENCOUNTER
----- Message from Keeley Edmond sent at 4/12/2019  1:59 PM CDT -----  .Type:  Patient Returning Call    Who Called:maxi  Who Left Message for Patient:justyn  Does the patient know what this is regarding? what meds to start for diabetes  Would the patient rather a call back or a response via [facility]? call  Best Call Back Number:[phone]  Additional Information:

## 2019-04-16 ENCOUNTER — HOSPITAL ENCOUNTER (OUTPATIENT)
Dept: RADIOLOGY | Facility: HOSPITAL | Age: 74
Discharge: HOME | End: 2019-04-16
Attending: NURSE PRACTITIONER
Payer: MEDICARE

## 2019-04-16 DIAGNOSIS — Z12.31 ENCOUNTER FOR SCREENING MAMMOGRAM FOR BREAST CANCER: ICD-10-CM

## 2019-05-01 ENCOUNTER — TELEPHONE (OUTPATIENT)
Dept: FAMILY MEDICINE | Facility: CLINIC | Age: 74
End: 2019-05-01

## 2019-05-01 RX ORDER — LATANOPROST 50 UG/ML
SOLUTION/ DROPS OPHTHALMIC
COMMUNITY
Start: 2019-04-16

## 2019-05-01 NOTE — TELEPHONE ENCOUNTER
----- Message from Keeley Edmond sent at 5/1/2019 11:15 AM CDT -----  Contact: mr [facility]  states that diabetic med change still hasn't been sent to pharmacy, please advise...[phone]

## 2019-05-07 ENCOUNTER — PATIENT OUTREACH (OUTPATIENT)
Dept: ADMINISTRATIVE | Facility: HOSPITAL | Age: 74
End: 2019-05-07

## 2019-05-13 ENCOUNTER — TELEPHONE (OUTPATIENT)
Dept: FAMILY MEDICINE | Facility: CLINIC | Age: 74
End: 2019-05-13

## 2019-05-13 NOTE — TELEPHONE ENCOUNTER
Fax received from [facility]  asking for last OV note.  I informed staff, on Friday, that fax has not gone thru.  Fax attempted 32 times and will not go through, will mail to company

## 2019-05-14 ENCOUNTER — PATIENT OUTREACH (OUTPATIENT)
Dept: ADMINISTRATIVE | Facility: HOSPITAL | Age: 74
End: 2019-05-14

## 2019-05-21 ENCOUNTER — TELEPHONE (OUTPATIENT)
Dept: ADMINISTRATIVE | Facility: CLINIC | Age: 74
End: 2019-05-21

## 2019-05-21 NOTE — TELEPHONE ENCOUNTER
Home Health Recert 05/09/2019 to 07/07/2019 with [facility] SharynDimitri) - Dr. Parminder Sahni. SN service provided.

## 2019-05-28 ENCOUNTER — OFFICE VISIT (OUTPATIENT)
Dept: VASCULAR SURGERY | Facility: CLINIC | Age: 74
End: 2019-05-28
Payer: MEDICARE

## 2019-05-28 ENCOUNTER — OFFICE VISIT (OUTPATIENT)
Dept: FAMILY MEDICINE | Facility: CLINIC | Age: 74
End: 2019-05-28
Payer: MEDICARE

## 2019-05-28 VITALS
DIASTOLIC BLOOD PRESSURE: 64 MMHG | HEIGHT: 65 IN | BODY MASS INDEX: 28.55 KG/M2 | TEMPERATURE: 98 F | HEART RATE: 70 BPM | WEIGHT: 171.38 LBS | SYSTOLIC BLOOD PRESSURE: 132 MMHG

## 2019-05-28 VITALS
SYSTOLIC BLOOD PRESSURE: 132 MMHG | HEART RATE: 70 BPM | RESPIRATION RATE: 18 BRPM | HEIGHT: 65 IN | DIASTOLIC BLOOD PRESSURE: 64 MMHG | WEIGHT: 171 LBS | BODY MASS INDEX: 28.49 KG/M2

## 2019-05-28 DIAGNOSIS — N18.30 DIABETES MELLITUS WITH STAGE 3 CHRONIC KIDNEY DISEASE: ICD-10-CM

## 2019-05-28 DIAGNOSIS — I15.2 HYPERTENSION ASSOCIATED WITH DIABETES: Primary | ICD-10-CM

## 2019-05-28 DIAGNOSIS — E11.22 DIABETES MELLITUS WITH STAGE 3 CHRONIC KIDNEY DISEASE: ICD-10-CM

## 2019-05-28 DIAGNOSIS — Z95.1 S/P CABG X 4: ICD-10-CM

## 2019-05-28 DIAGNOSIS — Z95.1 S/P CABG X 4: Primary | ICD-10-CM

## 2019-05-28 DIAGNOSIS — E11.59 HYPERTENSION ASSOCIATED WITH DIABETES: Primary | ICD-10-CM

## 2019-05-28 DIAGNOSIS — I25.119 CORONARY ARTERY DISEASE INVOLVING NATIVE CORONARY ARTERY OF NATIVE HEART WITH ANGINA PECTORIS: ICD-10-CM

## 2019-05-28 PROCEDURE — 1101F PT FALLS ASSESS-DOCD LE1/YR: CPT | Mod: S$GLB,,, | Performed by: FAMILY MEDICINE

## 2019-05-28 PROCEDURE — 99213 OFFICE O/P EST LOW 20 MIN: CPT | Mod: S$GLB,,, | Performed by: FAMILY MEDICINE

## 2019-05-28 PROCEDURE — 3075F SYST BP GE 130 - 139MM HG: CPT | Mod: S$GLB,,, | Performed by: FAMILY MEDICINE

## 2019-05-28 PROCEDURE — 3045F PR MOST RECENT HEMOGLOBIN A1C LEVEL 7.0-9.0%: ICD-10-PCS | Mod: S$GLB,,, | Performed by: FAMILY MEDICINE

## 2019-05-28 PROCEDURE — 3078F DIAST BP <80 MM HG: CPT | Mod: S$GLB,,, | Performed by: FAMILY MEDICINE

## 2019-05-28 PROCEDURE — 99999 PR PBB SHADOW E&M-EST. PATIENT-LVL III: ICD-10-PCS | Mod: PBBFAC,,, | Performed by: FAMILY MEDICINE

## 2019-05-28 PROCEDURE — 3045F PR MOST RECENT HEMOGLOBIN A1C LEVEL 7.0-9.0%: CPT | Mod: S$GLB,,, | Performed by: FAMILY MEDICINE

## 2019-05-28 PROCEDURE — 3075F PR MOST RECENT SYSTOLIC BLOOD PRESS GE 130-139MM HG: ICD-10-PCS | Mod: S$GLB,,, | Performed by: FAMILY MEDICINE

## 2019-05-28 PROCEDURE — 99999 PR PBB SHADOW E&M-EST. PATIENT-LVL III: ICD-10-PCS | Mod: PBBFAC,,, | Performed by: THORACIC SURGERY (CARDIOTHORACIC VASCULAR SURGERY)

## 2019-05-28 PROCEDURE — 99024 POSTOP FOLLOW-UP VISIT: CPT | Mod: S$GLB,,, | Performed by: THORACIC SURGERY (CARDIOTHORACIC VASCULAR SURGERY)

## 2019-05-28 PROCEDURE — 99999 PR PBB SHADOW E&M-EST. PATIENT-LVL III: CPT | Mod: PBBFAC,,, | Performed by: THORACIC SURGERY (CARDIOTHORACIC VASCULAR SURGERY)

## 2019-05-28 PROCEDURE — 99999 PR PBB SHADOW E&M-EST. PATIENT-LVL III: CPT | Mod: PBBFAC,,, | Performed by: FAMILY MEDICINE

## 2019-05-28 PROCEDURE — 99213 PR OFFICE/OUTPT VISIT, EST, LEVL III, 20-29 MIN: ICD-10-PCS | Mod: S$GLB,,, | Performed by: FAMILY MEDICINE

## 2019-05-28 PROCEDURE — 1101F PR PT FALLS ASSESS DOC 0-1 FALLS W/OUT INJ PAST YR: ICD-10-PCS | Mod: S$GLB,,, | Performed by: FAMILY MEDICINE

## 2019-05-28 PROCEDURE — 3078F PR MOST RECENT DIASTOLIC BLOOD PRESSURE < 80 MM HG: ICD-10-PCS | Mod: S$GLB,,, | Performed by: FAMILY MEDICINE

## 2019-05-28 PROCEDURE — 99024 PR POST-OP FOLLOW-UP VISIT: ICD-10-PCS | Mod: S$GLB,,, | Performed by: THORACIC SURGERY (CARDIOTHORACIC VASCULAR SURGERY)

## 2019-05-28 RX ORDER — ONDANSETRON 4 MG/1
TABLET, FILM COATED ORAL
Qty: 30 TABLET | Refills: 2 | Status: SHIPPED | OUTPATIENT
Start: 2019-05-28

## 2019-05-28 RX ORDER — ONDANSETRON 4 MG/1
TABLET, FILM COATED ORAL
Qty: 30 TABLET | Refills: 1 | Status: SHIPPED | OUTPATIENT
Start: 2019-05-28 | End: 2019-05-28 | Stop reason: SDUPTHER

## 2019-05-28 NOTE — PROGRESS NOTES
"Subjective:       Eunice Hutson presents to the clinic after undergoing coronary artery bypass surgery on 03/04/2019.  She continues to do well since surgery. She has no angina.       Objective:      /64   Pulse 70   Resp 18   Ht 5' 5" (1.651 m)   Wt 77.6 kg (171 lb)   BMI 28.46 kg/m²     Objective  General:  She appears well.  Chest:  Sternum is stable.  Sternotomy incisions are healed.  Lungs:  Clear bilaterally.  Heart:  Regular rate and rhythm.  No rubs or murmurs.  Extremities:  No edema.  Left leg incisions are healed.       Assessment:      Doing well postoperatively.      Plan:      1. Continue any current medications.  2. Wound care discussed.  3. Pt is to increase activities as tolerated.  4. Follow up:  As needed    "

## 2019-05-28 NOTE — PROGRESS NOTES
Patient presents follow-up.  Blood pressure controlled.  Sugars as below.  She still on Janumet, but is changing to plain metformin in a couple of days due to expense.  She has been taking her glimepiride about an hour after supper rather than with breakfast.  No obvious hypoglycemia.  She has a hemoglobin A1c pending a couple of weeks.  Still recovering from CABG a few months ago.  States get some intermittent nausea and wanted to get a refill on Zofran.  Pending follow-up with cardiothoracic surgery today.        Diabetes Management Status    Statin: Taking  ACE/ARB: Taking    Screening or Prevention Patient's value Goal Complete/Controlled?   HgA1C Testing and Control   Lab Results   Component Value Date    HGBA1C 8.5 (H) 03/14/2019      Annually/Less than 8% No   Lipid profile : 03/04/2019 Annually Yes   LDL control Lab Results   Component Value Date    LDLCALC 148 02/28/2019    Annually/Less than 100 mg/dl  No   Nephropathy screening Lab Results   Component Value Date    LABMICR 4.0 03/14/2019     Lab Results   Component Value Date    PROTEINUA SEE COMMENT 06/02/2017    Annually Yes   Blood pressure BP Readings from Last 1 Encounters:   05/28/19 132/64    Less than 140/90 Yes   Dilated retinal exam : 04/16/2019 Annually Yes   Foot exam   : 03/14/2019 Annually Yes       Past Medical History:  Past Medical History:   Diagnosis Date    Anemia     Blood transfusion     Chronic gastritis     Coronary artery disease involving native coronary artery of native heart with angina pectoris 3/1/2019    Depression     Diabetes mellitus with stage 3 chronic kidney disease     Diverticulosis     DM type 2 (diabetes mellitus, type 2) 2/27/2012    GERD (gastroesophageal reflux disease)     Glaucoma (increased eye pressure)     Hiatal hernia     Hyperlipidemia LDL goal < 100 2/27/2012    Hypertension goal BP (blood pressure) < 130/80 2/27/2012    NSTEMI (non-ST elevated myocardial infarction) 1/7/2018    RLS  (restless legs syndrome)     Statin intolerance 1/15/2018     Past Surgical History:   Procedure Laterality Date    ARTHROSCOPY, SHOULDER Right 6/19/2012    [facility] OR    BACK SURGERY  01/05/2011    BREAST BIOPSY      benign    CARDIAC CATHETERIZATION      COLONOSCOPY  11/16/2011    COLONOSCOPY  1/24/1013    CORONARY ARTERY BYPASS GRAFT  03/04/2019    ESOPHAGOGASTRODUODENOSCOPY  11/17/2011    ESOPHAGOGASTRODUODENOSCOPY  1/16/2013,2/29/16    EYE SURGERY      FOOT SURGERY       derotational arthroplasty of the fifth digit right foot     HEMORRHOID SURGERY      REPAIR, ROTATOR CUFF, ARTHROSCOPIC Right 6/19/2012    Per[facility] by Ter[facility] Jr[facility] at [facility] OR    SHOULDER ARTHROSCOPY      TUBAL LIGATION      VAGINAL DELIVERY      times 2     Social History     Socioeconomic History    Marital status:      Spouse name: Not on file    Number of children: Not on file    Years of education: Not on file    Highest education level: Not on file   Occupational History    Occupation: retired accounting   Social Needs    Financial resource strain: Not on file    Food insecurity:     Worry: Not on file     Inability: Not on file    Transportation needs:     Medical: Not on file     Non-medical: Not on file   Tobacco Use    Smoking status: Never Smoker    Smokeless tobacco: Never Used   Substance and Sexual Activity    Alcohol use: No    Drug use: No    Sexual activity: Never   Lifestyle    Physical activity:     Days per week: Not on file     Minutes per session: Not on file    Stress: Not on file   Relationships    Social connections:     Talks on phone: Not on file     Gets together: Not on file     Attends Evangelical service: Not on file     Active member of club or organization: Not on file     Attends meetings of clubs or organizations: Not on file     Relationship status: Not on file   Other Topics Concern    Not on file   Social History Narrative     "Not on file     Family History   Problem Relation Age of Onset    Heart disease Mother     Prostate cancer Father     Lung cancer Father     Ovarian cancer Sister     Colon cancer Brother 43    Cancer Brother         Colon    Kidney disease Brother         Kidney transplant    Diabetes Sister      Review of patient's allergies indicates:   Allergen Reactions    Atorvastatin Other (See Comments)     Other reaction(s): Other (See Comments)  Leg cramps  Leg cramps  Leg cramps    Caffeine Other (See Comments)     Other reaction(s): Other (See Comments)  Makes hyper  "Makes me high"  Makes hyper  Makes hyper  "Makes me high"    Codeine Itching     Other reaction(s): Rash  Able to take Lortab  Took a percocet  Other reaction(s): Rash  Able to take Lortab  Other reaction(s): Rash  Able to take Lortab  Took a percocet    Pravastatin Other (See Comments)     Other reaction(s): Other (See Comments)  Leg cramps  Leg cramps  Leg cramps    Pregabalin Hallucinations and Other (See Comments)     Other reaction(s): Other (See Comments)    Sulfamethoxazole-trimethoprim Other (See Comments)     Other reaction(s): Other (See Comments)     Current Outpatient Medications on File Prior to Visit   Medication Sig Dispense Refill    acetaminophen (TYLENOL) 325 MG tablet Take 650 mg by mouth.      aspirin 325 MG tablet Take 325 mg by mouth once daily.      blood sugar diagnostic Strp To check BG one time daily, to use with insurance preferred meter 100 strip 3    blood-glucose meter kit To check BG one time daily, to use with insurance preferred meter 1 each 0    docusate calcium (SURFAK) 240 mg capsule Take 240 mg by mouth daily as needed.       gabapentin (NEURONTIN) 300 MG capsule Take 2 capsules (600 mg total) by mouth every evening. 180 capsule 1    glimepiride (AMARYL) 1 MG tablet Take 1 tablet (1 mg total) by mouth daily with breakfast. 90 tablet 1    latanoprost 0.005 % ophthalmic solution       lisinopril " "(PRINIVIL,ZESTRIL) 5 MG tablet Take 1 tablet (5 mg total) by mouth once daily. 90 tablet 1    metFORMIN (GLUCOPHAGE-XR) 500 MG 24 hr tablet Take 2 tablets (1,000 mg total) by mouth 2 (two) times daily. 180 tablet 1    metoprolol succinate (TOPROL-XL) 25 MG 24 hr tablet Take 1 tablet (25 mg total) by mouth once daily. 90 tablet 1    nitroGLYCERIN (NITROSTAT) 0.4 MG SL tablet Place 0.4 mg under the tongue.      ONETOUCH DELICA LANCETS 33 gauge Misc       pantoprazole (PROTONIX) 40 MG tablet Take 1 tablet (40 mg total) by mouth once daily. 90 tablet 3    rOPINIRole (REQUIP) 1 MG tablet Take 1 tablet (1 mg total) by mouth nightly. 90 tablet 3    rosuvastatin (CRESTOR) 10 MG tablet Take 1 tablet (10 mg total) by mouth once daily. 90 tablet 1    [DISCONTINUED] ondansetron (ZOFRAN) 4 MG tablet Take 1 tablet (4 mg total) by mouth every 8 hours as needed for Nausea. 30 tablet 0    [DISCONTINUED] HYDROcodone-acetaminophen (NORCO) 5-325 mg per tablet Take 1 tablet by mouth every 8 (eight) hours as needed for Pain.      [DISCONTINUED] traMADol (ULTRAM) 50 mg tablet Take 50 mg by mouth every 6 (six) hours as needed for Pain.       No current facility-administered medications on file prior to visit.          ROS:  GENERAL: No fever, chills,  or significant weight changes.   CARDIOVASCULAR: Denies chest pain, PND, orthopnea.  ABDOMEN: Appetite fine. Denies diarrhea, abdominal pain, hematemesis or blood in stool.  URINARY: No flank pain, dysuria or hematuria.    Vitals:    05/28/19 0755   BP: 132/64   Pulse: 70   Temp: 97.5 °F (36.4 °C)   Weight: 77.7 kg (171 lb 6.4 oz)   Height: 5' 5" (1.651 m)       Wt Readings from Last 3 Encounters:   05/28/19 77.7 kg (171 lb 6.4 oz)   04/02/19 77.8 kg (171 lb 9.6 oz)   01/30/19 76.2 kg (168 lb)       APPEARANCE: Well nourished, well developed, in no acute distress.    HEAD: Normocephalic.  Atraumatic.  EYES:   Right eye: Pupil reactive.  Conjunctiva clear.    Left eye: Pupil " reactive.  Conjunctiva clear.    NECK: Supple. No bruits.  No JVD.  No cervical lymphadenopathy.  No thyromegaly.    CHEST: Breath sounds clear bilaterally.  Normal respiratory effort  CARDIOVASCULAR: Normal rate.  Regular rhythm.  No murmurs.  No rub.  No gallops.   No edema.  MENTAL STATUS: Alert.  Oriented x 3.      Eunice was seen today for follow-up.    Diagnoses and all orders for this visit:    Hypertension associated with diabetes    Coronary artery disease involving native coronary artery of native heart with angina pectoris    S/P CABG x 4    Diabetes mellitus with stage 3 chronic kidney disease    Other orders  -     Discontinue: ondansetron (ZOFRAN) 4 MG tablet; Take 1 tablet (4 mg total) by mouth every 8 hours as needed for Nausea.  -     ondansetron (ZOFRAN) 4 MG tablet; Take 1 tablet (4 mg total) by mouth every 8 hours as needed for Nausea.     I advised her to take her glimepiride with breakfast.  She is changing from Janumet to plain metformin in a couple of days due to expense concerns.  She has follow-up laboratory to include hemoglobin A1c scheduled for about 2 weeks.  Will have her follow up with an appointment after that.  I advised her to check some pre-dinner glucose readings as well.  Likely will need additional medication for diabetes either increasing glimepiride depending on tolerance or other medication.

## 2019-06-04 ENCOUNTER — PATIENT OUTREACH (OUTPATIENT)
Dept: ADMINISTRATIVE | Facility: HOSPITAL | Age: 74
End: 2019-06-04

## 2019-06-04 NOTE — PROGRESS NOTES
Attempted to contact pt to schedule mammogram on day of appt 6/18/19. No answer and unable to leave message. Mammogram order pended.

## 2019-06-18 ENCOUNTER — OFFICE VISIT (OUTPATIENT)
Dept: FAMILY MEDICINE | Facility: CLINIC | Age: 74
End: 2019-06-18
Payer: MEDICARE

## 2019-06-18 ENCOUNTER — LAB VISIT (OUTPATIENT)
Dept: LAB | Facility: HOSPITAL | Age: 74
End: 2019-06-18
Attending: FAMILY MEDICINE
Payer: MEDICARE

## 2019-06-18 VITALS
DIASTOLIC BLOOD PRESSURE: 64 MMHG | HEIGHT: 65 IN | TEMPERATURE: 98 F | SYSTOLIC BLOOD PRESSURE: 108 MMHG | WEIGHT: 169.31 LBS | HEART RATE: 72 BPM | BODY MASS INDEX: 28.21 KG/M2

## 2019-06-18 DIAGNOSIS — I15.2 HYPERTENSION ASSOCIATED WITH DIABETES: Primary | ICD-10-CM

## 2019-06-18 DIAGNOSIS — Z95.1 S/P CABG X 4: ICD-10-CM

## 2019-06-18 DIAGNOSIS — E78.5 HYPERLIPIDEMIA: ICD-10-CM

## 2019-06-18 DIAGNOSIS — N28.9 FUNCTION KIDNEY DECREASED: ICD-10-CM

## 2019-06-18 DIAGNOSIS — E78.2 COMBINED HYPERLIPIDEMIA ASSOCIATED WITH TYPE 2 DIABETES MELLITUS: ICD-10-CM

## 2019-06-18 DIAGNOSIS — E11.59 HYPERTENSION ASSOCIATED WITH DIABETES: Primary | ICD-10-CM

## 2019-06-18 DIAGNOSIS — E11.69 COMBINED HYPERLIPIDEMIA ASSOCIATED WITH TYPE 2 DIABETES MELLITUS: ICD-10-CM

## 2019-06-18 DIAGNOSIS — E11.9 TYPE 2 DIABETES MELLITUS WITHOUT COMPLICATION: ICD-10-CM

## 2019-06-18 DIAGNOSIS — I25.119 CORONARY ARTERY DISEASE INVOLVING NATIVE CORONARY ARTERY OF NATIVE HEART WITH ANGINA PECTORIS: ICD-10-CM

## 2019-06-18 DIAGNOSIS — N18.30 DIABETES MELLITUS WITH STAGE 3 CHRONIC KIDNEY DISEASE: ICD-10-CM

## 2019-06-18 DIAGNOSIS — E11.22 DIABETES MELLITUS WITH STAGE 3 CHRONIC KIDNEY DISEASE: ICD-10-CM

## 2019-06-18 LAB
ALT SERPL W/O P-5'-P-CCNC: 6 U/L (ref 10–44)
ANION GAP SERPL CALC-SCNC: 12 MMOL/L (ref 8–16)
BASOPHILS # BLD AUTO: 0 K/UL (ref 0–0.2)
BASOPHILS NFR BLD: 0 % (ref 0–1.9)
BUN SERPL-MCNC: 24 MG/DL (ref 8–23)
CALCIUM SERPL-MCNC: 10.2 MG/DL (ref 8.7–10.5)
CHLORIDE SERPL-SCNC: 103 MMOL/L (ref 95–110)
CHOLEST SERPL-MCNC: 179 MG/DL (ref 120–199)
CHOLEST/HDLC SERPL: 2.5 {RATIO} (ref 2–5)
CO2 SERPL-SCNC: 25 MMOL/L (ref 23–29)
CREAT SERPL-MCNC: 1.5 MG/DL (ref 0.5–1.4)
DIFFERENTIAL METHOD: ABNORMAL
EOSINOPHIL # BLD AUTO: 0.1 K/UL (ref 0–0.5)
EOSINOPHIL NFR BLD: 1.6 % (ref 0–8)
ERYTHROCYTE [DISTWIDTH] IN BLOOD BY AUTOMATED COUNT: 13.6 % (ref 11.5–14.5)
EST. GFR  (AFRICAN AMERICAN): 39.8 ML/MIN/1.73 M^2
EST. GFR  (NON AFRICAN AMERICAN): 34.6 ML/MIN/1.73 M^2
ESTIMATED AVG GLUCOSE: 154 MG/DL (ref 68–131)
GLUCOSE SERPL-MCNC: 149 MG/DL (ref 70–110)
HBA1C MFR BLD HPLC: 7 % (ref 4–5.6)
HCT VFR BLD AUTO: 39.2 % (ref 37–48.5)
HDLC SERPL-MCNC: 73 MG/DL (ref 40–75)
HDLC SERPL: 40.8 % (ref 20–50)
HGB BLD-MCNC: 11.8 G/DL (ref 12–16)
IMM GRANULOCYTES # BLD AUTO: 0.01 K/UL (ref 0–0.04)
IMM GRANULOCYTES NFR BLD AUTO: 0.2 % (ref 0–0.5)
LDLC SERPL CALC-MCNC: 90 MG/DL (ref 63–159)
LYMPHOCYTES # BLD AUTO: 1.9 K/UL (ref 1–4.8)
LYMPHOCYTES NFR BLD: 42.6 % (ref 18–48)
MCH RBC QN AUTO: 28.8 PG (ref 27–31)
MCHC RBC AUTO-ENTMCNC: 30.1 G/DL (ref 32–36)
MCV RBC AUTO: 96 FL (ref 82–98)
MONOCYTES # BLD AUTO: 0.2 K/UL (ref 0.3–1)
MONOCYTES NFR BLD: 4.6 % (ref 4–15)
NEUTROPHILS # BLD AUTO: 2.2 K/UL (ref 1.8–7.7)
NEUTROPHILS NFR BLD: 51 % (ref 38–73)
NONHDLC SERPL-MCNC: 106 MG/DL
NRBC BLD-RTO: 0 /100 WBC
PLATELET # BLD AUTO: 269 K/UL (ref 150–350)
PMV BLD AUTO: 11.3 FL (ref 9.2–12.9)
POTASSIUM SERPL-SCNC: 5 MMOL/L (ref 3.5–5.1)
RBC # BLD AUTO: 4.1 M/UL (ref 4–5.4)
SODIUM SERPL-SCNC: 140 MMOL/L (ref 136–145)
TRIGL SERPL-MCNC: 80 MG/DL (ref 30–150)
WBC # BLD AUTO: 4.39 K/UL (ref 3.9–12.7)

## 2019-06-18 PROCEDURE — 84460 ALANINE AMINO (ALT) (SGPT): CPT

## 2019-06-18 PROCEDURE — 1100F PR PT FALLS ASSESS DOC 2+ FALLS/FALL W/INJURY/YR: ICD-10-PCS | Mod: S$GLB,,, | Performed by: FAMILY MEDICINE

## 2019-06-18 PROCEDURE — 80061 LIPID PANEL: CPT

## 2019-06-18 PROCEDURE — 83036 HEMOGLOBIN GLYCOSYLATED A1C: CPT

## 2019-06-18 PROCEDURE — 3045F PR MOST RECENT HEMOGLOBIN A1C LEVEL 7.0-9.0%: ICD-10-PCS | Mod: S$GLB,,, | Performed by: FAMILY MEDICINE

## 2019-06-18 PROCEDURE — 3074F SYST BP LT 130 MM HG: CPT | Mod: S$GLB,,, | Performed by: FAMILY MEDICINE

## 2019-06-18 PROCEDURE — 3078F PR MOST RECENT DIASTOLIC BLOOD PRESSURE < 80 MM HG: ICD-10-PCS | Mod: S$GLB,,, | Performed by: FAMILY MEDICINE

## 2019-06-18 PROCEDURE — 99214 OFFICE O/P EST MOD 30 MIN: CPT | Mod: S$GLB,,, | Performed by: FAMILY MEDICINE

## 2019-06-18 PROCEDURE — 36415 COLL VENOUS BLD VENIPUNCTURE: CPT | Mod: PO

## 2019-06-18 PROCEDURE — 99999 PR PBB SHADOW E&M-EST. PATIENT-LVL IV: ICD-10-PCS | Mod: PBBFAC,,, | Performed by: FAMILY MEDICINE

## 2019-06-18 PROCEDURE — 3288F FALL RISK ASSESSMENT DOCD: CPT | Mod: S$GLB,,, | Performed by: FAMILY MEDICINE

## 2019-06-18 PROCEDURE — 3078F DIAST BP <80 MM HG: CPT | Mod: S$GLB,,, | Performed by: FAMILY MEDICINE

## 2019-06-18 PROCEDURE — 85025 COMPLETE CBC W/AUTO DIFF WBC: CPT

## 2019-06-18 PROCEDURE — 3288F PR FALLS RISK ASSESSMENT DOCUMENTED: ICD-10-PCS | Mod: S$GLB,,, | Performed by: FAMILY MEDICINE

## 2019-06-18 PROCEDURE — 3074F PR MOST RECENT SYSTOLIC BLOOD PRESSURE < 130 MM HG: ICD-10-PCS | Mod: S$GLB,,, | Performed by: FAMILY MEDICINE

## 2019-06-18 PROCEDURE — 1100F PTFALLS ASSESS-DOCD GE2>/YR: CPT | Mod: S$GLB,,, | Performed by: FAMILY MEDICINE

## 2019-06-18 PROCEDURE — 99214 PR OFFICE/OUTPT VISIT, EST, LEVL IV, 30-39 MIN: ICD-10-PCS | Mod: S$GLB,,, | Performed by: FAMILY MEDICINE

## 2019-06-18 PROCEDURE — 99999 PR PBB SHADOW E&M-EST. PATIENT-LVL IV: CPT | Mod: PBBFAC,,, | Performed by: FAMILY MEDICINE

## 2019-06-18 PROCEDURE — 80048 BASIC METABOLIC PNL TOTAL CA: CPT

## 2019-06-18 PROCEDURE — 3045F PR MOST RECENT HEMOGLOBIN A1C LEVEL 7.0-9.0%: CPT | Mod: S$GLB,,, | Performed by: FAMILY MEDICINE

## 2019-06-18 RX ORDER — TRAMADOL HYDROCHLORIDE 50 MG/1
50 TABLET ORAL EVERY 6 HOURS PRN
Qty: 28 TABLET | Refills: 0 | Status: SHIPPED | OUTPATIENT
Start: 2019-06-18

## 2019-06-18 RX ORDER — LISINOPRIL 5 MG/1
5 TABLET ORAL DAILY
Qty: 90 TABLET | Refills: 3 | Status: SHIPPED | OUTPATIENT
Start: 2019-06-18

## 2019-06-18 RX ORDER — METOPROLOL SUCCINATE 25 MG/1
25 TABLET, EXTENDED RELEASE ORAL DAILY
Qty: 90 TABLET | Refills: 3 | Status: SHIPPED | OUTPATIENT
Start: 2019-06-18

## 2019-06-18 RX ORDER — ASPIRIN 81 MG/1
81 TABLET ORAL DAILY
COMMUNITY

## 2019-06-18 NOTE — PROGRESS NOTES
Follow-up diabetes.  Poorly controlled.  Did not bring in glucose readings.  Missed her laboratory appointment last week.  She recalls readings mostly in the 140s.  Possibly had a reading in the 70s a few weeks ago.  States was 247 this morning.  Recent laboratory reviewed as below.  Still recovering from coronary bypass.  Going on a trip to [place] for a month soon.  She does complain of some chronic intermittent back pain mostly thoracic area.  She has had this intermittently for years.  Occasional tramadol use.  Needs refill.    Eunice was seen today for follow-up.    Diagnoses and all orders for this visit:    Hypertension associated with diabetes    Combined hyperlipidemia associated with type 2 diabetes mellitus    Coronary artery disease involving native coronary artery of native heart with angina pectoris    S/P CABG x 4    Diabetes mellitus with stage 3 chronic kidney disease    Other orders  -     Cancel: Mammo Digital Screening Bilateral With CAD; Future  -     traMADol (ULTRAM) 50 mg tablet; Take 1 tablet (50 mg total) by mouth every 6 (six) hours as needed for Pain.  -     metoprolol succinate (TOPROL-XL) 25 MG 24 hr tablet; Take 1 tablet (25 mg total) by mouth once daily.  -     lisinopril (PRINIVIL,ZESTRIL) 5 MG tablet; Take 1 tablet (5 mg total) by mouth once daily.     We will check laboratory today that she missed last week.  Have her mail in her sugar readings.  Further adjustment pending results.  Likely needs additional medication.  She was unable to afford the previous Janumet.  She wants to defer her mammogram for now until she recovers further from her surgery.    Diabetes Management Status    Statin: Taking  ACE/ARB: Taking    Screening or Prevention Patient's value Goal Complete/Controlled?   HgA1C Testing and Control   Lab Results   Component Value Date    HGBA1C 8.5 (H) 03/14/2019      Annually/Less than 8% No   Lipid profile : 03/04/2019 Annually Yes   LDL control Lab Results    Component Value Date    LDLCALC 148 02/28/2019    Annually/Less than 100 mg/dl  No   Nephropathy screening Lab Results   Component Value Date    LABMICR 4.0 03/14/2019     Lab Results   Component Value Date    PROTEINUA SEE COMMENT 06/02/2017    Annually Yes   Blood pressure BP Readings from Last 1 Encounters:   06/18/19 108/64    Less than 140/90 Yes   Dilated retinal exam : 04/16/2019 Annually Yes   Foot exam   : 05/28/2019 Annually Yes               Past Medical History:  Past Medical History:   Diagnosis Date    Anemia     Blood transfusion     Chronic gastritis     Coronary artery disease involving native coronary artery of native heart with angina pectoris 3/1/2019    Depression     Diabetes mellitus with stage 3 chronic kidney disease     Diverticulosis     DM type 2 (diabetes mellitus, type 2) 2/27/2012    GERD (gastroesophageal reflux disease)     Glaucoma (increased eye pressure)     Hiatal hernia     Hyperlipidemia LDL goal < 100 2/27/2012    Hypertension goal BP (blood pressure) < 130/80 2/27/2012    NSTEMI (non-ST elevated myocardial infarction) 1/7/2018    RLS (restless legs syndrome)     Statin intolerance 1/15/2018     Past Surgical History:   Procedure Laterality Date    ARTHROSCOPY, SHOULDER Right 6/19/2012    [facility] OR    BACK SURGERY  01/05/2011    BREAST BIOPSY      benign    CARDIAC CATHETERIZATION      COLONOSCOPY  11/16/2011    COLONOSCOPY  1/24/1013    CORONARY ARTERY BYPASS GRAFT  03/04/2019    ESOPHAGOGASTRODUODENOSCOPY  11/17/2011    ESOPHAGOGASTRODUODENOSCOPY  1/16/2013,2/29/16    EYE SURGERY      FOOT SURGERY       derotational arthroplasty of the fifth digit right foot     HEMORRHOID SURGERY      REPAIR, ROTATOR CUFF, ARTHROSCOPIC Right 6/19/2012    Performed by Ter[facility] Montgomery Jr[facility] at [facility] OR    SHOULDER ARTHROSCOPY      TUBAL LIGATION      VAGINAL DELIVERY      times 2     Review of patient's allergies indicates:  "  Allergen Reactions    Atorvastatin Other (See Comments)     Other reaction(s): Other (See Comments)  Leg cramps  Leg cramps  Leg cramps    Caffeine Other (See Comments)     Other reaction(s): Other (See Comments)  Makes hyper  "Makes me high"  Makes hyper  Makes hyper  "Makes me high"    Codeine Itching     Other reaction(s): Rash  Able to take Lortab  Took a percocet  Other reaction(s): Rash  Able to take Lortab  Other reaction(s): Rash  Able to take Lortab  Took a percocet    Pravastatin Other (See Comments)     Other reaction(s): Other (See Comments)  Leg cramps  Leg cramps  Leg cramps    Pregabalin Hallucinations and Other (See Comments)     Other reaction(s): Other (See Comments)    Sulfamethoxazole-trimethoprim Other (See Comments)     Other reaction(s): Other (See Comments)     Current Outpatient Medications on File Prior to Visit   Medication Sig Dispense Refill    acetaminophen (TYLENOL) 325 MG tablet Take 650 mg by mouth.      aspirin (ECOTRIN) 81 MG EC tablet Take 81 mg by mouth once daily.      blood sugar diagnostic Strp To check BG one time daily, to use with insurance preferred meter 100 strip 3    blood-glucose meter kit To check BG one time daily, to use with insurance preferred meter 1 each 0    docusate calcium (SURFAK) 240 mg capsule Take 240 mg by mouth daily as needed.       gabapentin (NEURONTIN) 300 MG capsule Take 2 capsules (600 mg total) by mouth every evening. 180 capsule 1    glimepiride (AMARYL) 1 MG tablet Take 1 tablet (1 mg total) by mouth daily with breakfast. 90 tablet 1    latanoprost 0.005 % ophthalmic solution       metFORMIN (GLUCOPHAGE-XR) 500 MG 24 hr tablet Take 2 tablets (1,000 mg total) by mouth 2 (two) times daily. 180 tablet 1    nitroGLYCERIN (NITROSTAT) 0.4 MG SL tablet Place 0.4 mg under the tongue.      ondansetron (ZOFRAN) 4 MG tablet Take 1 tablet (4 mg total) by mouth every 8 hours as needed for Nausea. 30 tablet 2    ONETOUCH DELICA LANCETS 33 " gauge Misc       pantoprazole (PROTONIX) 40 MG tablet Take 1 tablet (40 mg total) by mouth once daily. 90 tablet 3    rOPINIRole (REQUIP) 1 MG tablet Take 1 tablet (1 mg total) by mouth nightly. 90 tablet 3    rosuvastatin (CRESTOR) 10 MG tablet Take 1 tablet (10 mg total) by mouth once daily. 90 tablet 1    [DISCONTINUED] lisinopril (PRINIVIL,ZESTRIL) 5 MG tablet Take 1 tablet (5 mg total) by mouth once daily. 90 tablet 1    [DISCONTINUED] metoprolol succinate (TOPROL-XL) 25 MG 24 hr tablet Take 1 tablet (25 mg total) by mouth once daily. 90 tablet 1    [DISCONTINUED] aspirin 325 MG tablet Take 325 mg by mouth once daily.       No current facility-administered medications on file prior to visit.      Social History     Socioeconomic History    Marital status:      Spouse name: Not on file    Number of children: Not on file    Years of education: Not on file    Highest education level: Not on file   Occupational History    Occupation: retired accounting   Social Needs    Financial resource strain: Not on file    Food insecurity:     Worry: Not on file     Inability: Not on file    Transportation needs:     Medical: Not on file     Non-medical: Not on file   Tobacco Use    Smoking status: Never Smoker    Smokeless tobacco: Never Used   Substance and Sexual Activity    Alcohol use: No    Drug use: No    Sexual activity: Never   Lifestyle    Physical activity:     Days per week: Not on file     Minutes per session: Not on file    Stress: Not on file   Relationships    Social connections:     Talks on phone: Not on file     Gets together: Not on file     Attends Holiness service: Not on file     Active member of club or organization: Not on file     Attends meetings of clubs or organizations: Not on file     Relationship status: Not on file   Other Topics Concern    Not on file   Social History Narrative    Not on file     Family History   Problem Relation Age of Onset    Heart disease  "Mother     Prostate cancer Father     Lung cancer Father     Ovarian cancer Sister     Colon cancer Brother 43    Cancer Brother         Colon    Kidney disease Brother         Kidney transplant    Diabetes Sister            ROS:  GENERAL: No fever, chills,  or significant weight changes.   CARDIOVASCULAR: Denies chest pain, PND, orthopnea or reduced exercise tolerance.  ABDOMEN: Appetite fine. Denies diarrhea, abdominal pain, hematemesis or blood in stool.  URINARY: No flank pain, dysuria or hematuria.    Vitals:    06/18/19 0755   BP: 108/64   Pulse: 72   Temp: 97.8 °F (36.6 °C)   TempSrc: Oral   Weight: 76.8 kg (169 lb 5 oz)   Height: 5' 5" (1.651 m)     Wt Readings from Last 3 Encounters:   06/18/19 76.8 kg (169 lb 5 oz)   05/28/19 77.6 kg (171 lb)   05/28/19 77.7 kg (171 lb 6.4 oz)       OBJECTIVE:   APPEARANCE: Well nourished, well developed, in no acute distress.    HEAD: Normocephalic.  Atraumatic.  No sinus tenderness.  EYES:   Right eye: Pupil reactive.  Conjunctiva clear.    Left eye: Pupil reactive.  Conjunctiva clear.  EOMI.    EARS: TM's intact. Light reflex normal. No retraction or perforation.    NOSE:  clear.  MOUTH & THROAT:  No pharyngeal erythema or exudate. No lesions.  NECK: Supple. No bruits.  No JVD.  No cervical lymphadenopathy.  No thyromegaly.    CHEST: Breath sounds clear bilaterally.  Normal respiratory effort  CARDIOVASCULAR: Normal rate.  Regular rhythm.  No murmurs.  No rub.  No gallops.  ABDOMEN: Bowel sounds normal.  Soft.  No tenderness.  No organomegaly.  PERIPHERAL VASCULAR: No cyanosis.  No clubbing.  No edema.  NEUROLOGIC: No focal findings.  MENTAL STATUS: Alert.  Oriented x 3.  Back with mild decreased range of motion.  Nontender to palpation currently.      "

## 2019-10-16 ENCOUNTER — TELEPHONE (OUTPATIENT)
Dept: VASCULAR SURGERY | Facility: CLINIC | Age: 74
End: 2019-10-16

## 2019-10-16 NOTE — TELEPHONE ENCOUNTER
----- Message from Cathi Yun sent at 10/16/2019 11:19 AM CDT -----  Contact: Patient   Type: Patient Call Back    Who called: Patient     What is the request in detail: Pt is requesting a call back to schedule a post op    Can the clinic reply by [facility]? No     Would the patient rather a call back or a response via [facility]? Call back     Best call back number: [phone]

## 2019-10-16 NOTE — TELEPHONE ENCOUNTER
Patient wanted to see Dr Foster to check her heart. I explained to her that he is the surgeon and has been discharged from his care. She needs to follow-up with her cardiologist, but she states she cannot afford the co-payment. She states she will discuss this with her PCP when she see him next time

## 2019-10-22 ENCOUNTER — HOSPITAL ENCOUNTER (OUTPATIENT)
Dept: RADIOLOGY | Facility: HOSPITAL | Age: 74
Discharge: HOME | End: 2019-10-22
Attending: NURSE PRACTITIONER
Payer: MEDICARE

## 2019-10-22 ENCOUNTER — OFFICE VISIT (OUTPATIENT)
Dept: FAMILY MEDICINE | Facility: CLINIC | Age: 74
End: 2019-10-22
Payer: MEDICARE

## 2019-10-22 VITALS
TEMPERATURE: 98 F | HEIGHT: 65 IN | DIASTOLIC BLOOD PRESSURE: 75 MMHG | SYSTOLIC BLOOD PRESSURE: 126 MMHG | WEIGHT: 171 LBS | HEART RATE: 73 BPM | BODY MASS INDEX: 28.49 KG/M2

## 2019-10-22 VITALS — BODY MASS INDEX: 28.49 KG/M2 | HEIGHT: 65 IN | WEIGHT: 171 LBS

## 2019-10-22 DIAGNOSIS — N18.30 DIABETES MELLITUS WITH STAGE 3 CHRONIC KIDNEY DISEASE: ICD-10-CM

## 2019-10-22 DIAGNOSIS — E11.22 DIABETES MELLITUS WITH STAGE 3 CHRONIC KIDNEY DISEASE: ICD-10-CM

## 2019-10-22 DIAGNOSIS — Z95.1 S/P CABG X 4: ICD-10-CM

## 2019-10-22 DIAGNOSIS — I25.10 CORONARY ARTERY DISEASE INVOLVING NATIVE CORONARY ARTERY OF NATIVE HEART WITHOUT ANGINA PECTORIS: Primary | ICD-10-CM

## 2019-10-22 DIAGNOSIS — E78.2 COMBINED HYPERLIPIDEMIA ASSOCIATED WITH TYPE 2 DIABETES MELLITUS: ICD-10-CM

## 2019-10-22 DIAGNOSIS — E11.69 COMBINED HYPERLIPIDEMIA ASSOCIATED WITH TYPE 2 DIABETES MELLITUS: ICD-10-CM

## 2019-10-22 DIAGNOSIS — Z12.31 ENCOUNTER FOR SCREENING MAMMOGRAM FOR BREAST CANCER: ICD-10-CM

## 2019-10-22 PROCEDURE — 3078F DIAST BP <80 MM HG: CPT | Mod: S$GLB,,, | Performed by: FAMILY MEDICINE

## 2019-10-22 PROCEDURE — 99999 PR PBB SHADOW E&M-EST. PATIENT-LVL IV: CPT | Mod: PBBFAC,,, | Performed by: FAMILY MEDICINE

## 2019-10-22 PROCEDURE — 1101F PR PT FALLS ASSESS DOC 0-1 FALLS W/OUT INJ PAST YR: ICD-10-PCS | Mod: S$GLB,,, | Performed by: FAMILY MEDICINE

## 2019-10-22 PROCEDURE — 3074F PR MOST RECENT SYSTOLIC BLOOD PRESSURE < 130 MM HG: ICD-10-PCS | Mod: S$GLB,,, | Performed by: FAMILY MEDICINE

## 2019-10-22 PROCEDURE — 99999 PR PBB SHADOW E&M-EST. PATIENT-LVL IV: ICD-10-PCS | Mod: PBBFAC,,, | Performed by: FAMILY MEDICINE

## 2019-10-22 PROCEDURE — 77063 BREAST TOMOSYNTHESIS BI: CPT | Mod: 26,,, | Performed by: RADIOLOGY

## 2019-10-22 PROCEDURE — 99214 OFFICE O/P EST MOD 30 MIN: CPT | Mod: S$GLB,,, | Performed by: FAMILY MEDICINE

## 2019-10-22 PROCEDURE — 3074F SYST BP LT 130 MM HG: CPT | Mod: S$GLB,,, | Performed by: FAMILY MEDICINE

## 2019-10-22 PROCEDURE — 77063 MAMMO DIGITAL SCREENING BILAT WITH TOMOSYNTHESIS_CAD: ICD-10-PCS | Mod: 26,,, | Performed by: RADIOLOGY

## 2019-10-22 PROCEDURE — 99214 PR OFFICE/OUTPT VISIT, EST, LEVL IV, 30-39 MIN: ICD-10-PCS | Mod: S$GLB,,, | Performed by: FAMILY MEDICINE

## 2019-10-22 PROCEDURE — 1101F PT FALLS ASSESS-DOCD LE1/YR: CPT | Mod: S$GLB,,, | Performed by: FAMILY MEDICINE

## 2019-10-22 PROCEDURE — 77067 MAMMO DIGITAL SCREENING BILAT WITH TOMOSYNTHESIS_CAD: ICD-10-PCS | Mod: 26,,, | Performed by: RADIOLOGY

## 2019-10-22 PROCEDURE — 3078F PR MOST RECENT DIASTOLIC BLOOD PRESSURE < 80 MM HG: ICD-10-PCS | Mod: S$GLB,,, | Performed by: FAMILY MEDICINE

## 2019-10-22 PROCEDURE — 77067 SCR MAMMO BI INCL CAD: CPT | Mod: 26,,, | Performed by: RADIOLOGY

## 2019-10-22 PROCEDURE — 77067 SCR MAMMO BI INCL CAD: CPT | Mod: TC,PO

## 2019-10-22 RX ORDER — ROSUVASTATIN CALCIUM 10 MG/1
10 TABLET, COATED ORAL DAILY
Qty: 90 TABLET | Refills: 3 | Status: SHIPPED | OUTPATIENT
Start: 2019-10-22 | End: 2019-11-06 | Stop reason: SINTOL

## 2019-10-22 RX ORDER — METFORMIN HYDROCHLORIDE 500 MG/1
1000 TABLET, EXTENDED RELEASE ORAL 2 TIMES DAILY
Qty: 180 TABLET | Refills: 3 | Status: SHIPPED | OUTPATIENT
Start: 2019-10-22

## 2019-10-22 RX ORDER — GABAPENTIN 300 MG/1
600 CAPSULE ORAL NIGHTLY
Qty: 180 CAPSULE | Refills: 3 | Status: SHIPPED | OUTPATIENT
Start: 2019-10-22

## 2019-10-22 RX ORDER — GLIMEPIRIDE 1 MG/1
1 TABLET ORAL
Qty: 90 TABLET | Refills: 3 | Status: SHIPPED | OUTPATIENT
Start: 2019-10-22

## 2019-10-22 NOTE — PROGRESS NOTES
Follow-up diabetes, coronary disease, hypertension.  Laboratory due today..    Did not bring in glucose readings, better compliance medications.  Doing better from coronary bypass.  She wants to establish with [facility].      Diagnoses and all orders for this visit:    Coronary artery disease involving native coronary artery of native heart without angina pectoris  -     Ambulatory referral to Cardiology    S/P CABG x 4  -     Ambulatory referral to Cardiology    Combined hyperlipidemia associated with type 2 diabetes mellitus    Diabetes mellitus with stage 3 chronic kidney disease    Other orders  -     gabapentin (NEURONTIN) 300 MG capsule; Take 2 capsules (600 mg total) by mouth every evening.  -     glimepiride (AMARYL) 1 MG tablet; Take 1 tablet (1 mg total) by mouth daily with breakfast.  -     metFORMIN (GLUCOPHAGE-XR) 500 MG 24 hr tablet; Take 2 tablets (1,000 mg total) by mouth 2 (two) times daily.  -     rosuvastatin (CRESTOR) 10 MG tablet; Take 1 tablet (10 mg total) by mouth once daily.      Check BMP, hemoglobin A1c, mammogram    Diabetes Management Status    Statin: Taking  ACE/ARB: Taking    Screening or Prevention Patient's value Goal Complete/Controlled?   HgA1C Testing and Control   Lab Results   Component Value Date    HGBA1C 7.0 (H) 10/22/2019      Annually/Less than 8% Yes   Lipid profile : 06/18/2019 Annually Yes   LDL control Lab Results   Component Value Date    LDLCALC 90.0 06/18/2019    Annually/Less than 100 mg/dl  Yes   Nephropathy screening Lab Results   Component Value Date    LABMICR 4.0 03/14/2019     Lab Results   Component Value Date    PROTEINUA SEE COMMENT 06/02/2017    Annually Yes   Blood pressure BP Readings from Last 1 Encounters:   10/22/19 126/75    Less than 140/90 Yes   Dilated retinal exam : 04/16/2019 Annually Yes   Foot exam   : 05/28/2019 Annually Yes               Past Medical History:  Past Medical History:   Diagnosis Date    Anemia     Blood transfusion  "    Chronic gastritis     Coronary artery disease involving native coronary artery of native heart with angina pectoris 3/1/2019    Depression     Diabetes mellitus with stage 3 chronic kidney disease     Diverticulosis     DM type 2 (diabetes mellitus, type 2) 2/27/2012    GERD (gastroesophageal reflux disease)     Glaucoma (increased eye pressure)     Hiatal hernia     Hyperlipidemia LDL goal < 100 2/27/2012    Hypertension goal BP (blood pressure) < 130/80 2/27/2012    NSTEMI (non-ST elevated myocardial infarction) 1/7/2018    RLS (restless legs syndrome)     Statin intolerance 1/15/2018     Past Surgical History:   Procedure Laterality Date    BACK SURGERY  01/05/2011    BREAST BIOPSY      benign    CARDIAC CATHETERIZATION      COLONOSCOPY  11/16/2011    COLONOSCOPY  1/24/1013    CORONARY ARTERY BYPASS GRAFT  03/04/2019    ESOPHAGOGASTRODUODENOSCOPY  11/17/2011    ESOPHAGOGASTRODUODENOSCOPY  1/16/2013,2/29/16    EYE SURGERY      FOOT SURGERY       derotational arthroplasty of the fifth digit right foot     HEMORRHOID SURGERY      SHOULDER ARTHROSCOPY      TUBAL LIGATION      VAGINAL DELIVERY      times 2     Review of patient's allergies indicates:   Allergen Reactions    Atorvastatin Other (See Comments)     Other reaction(s): Other (See Comments)  Leg cramps  Leg cramps  Leg cramps    Caffeine Other (See Comments)     Other reaction(s): Other (See Comments)  Makes hyper  "Makes me high"  Makes hyper  Makes hyper  "Makes me high"    Codeine Itching     Other reaction(s): Rash  Able to take Lortab  Took a percocet  Other reaction(s): Rash  Able to take Lortab  Other reaction(s): Rash  Able to take Lortab  Took a percocet    Pravastatin Other (See Comments)     Other reaction(s): Other (See Comments)  Leg cramps  Leg cramps  Leg cramps    Pregabalin Hallucinations and Other (See Comments)     Other reaction(s): Other (See Comments)    Sulfamethoxazole-trimethoprim Other (See " Comments)     Other reaction(s): Other (See Comments)     Current Outpatient Medications on File Prior to Visit   Medication Sig Dispense Refill    acetaminophen (TYLENOL) 325 MG tablet Take 650 mg by mouth.      aspirin (ECOTRIN) 81 MG EC tablet Take 81 mg by mouth once daily.      blood sugar diagnostic Strp To check BG one time daily, to use with insurance preferred meter 100 strip 3    blood-glucose meter kit To check BG one time daily, to use with insurance preferred meter 1 each 0    docusate calcium (SURFAK) 240 mg capsule Take 240 mg by mouth daily as needed.       latanoprost 0.005 % ophthalmic solution       lisinopril (PRINIVIL,ZESTRIL) 5 MG tablet Take 1 tablet (5 mg total) by mouth once daily. 90 tablet 3    metoprolol succinate (TOPROL-XL) 25 MG 24 hr tablet Take 1 tablet (25 mg total) by mouth once daily. 90 tablet 3    nitroGLYCERIN (NITROSTAT) 0.4 MG SL tablet Place 0.4 mg under the tongue.      ondansetron (ZOFRAN) 4 MG tablet Take 1 tablet (4 mg total) by mouth every 8 hours as needed for Nausea. 30 tablet 2    ONETOUCH DELICA LANCETS 33 gauge Misc       pantoprazole (PROTONIX) 40 MG tablet Take 1 tablet (40 mg total) by mouth once daily. 90 tablet 3    rOPINIRole (REQUIP) 1 MG tablet Take 1 tablet (1 mg total) by mouth nightly. 90 tablet 3    traMADol (ULTRAM) 50 mg tablet Take 1 tablet (50 mg total) by mouth every 6 (six) hours as needed for Pain. 28 tablet 0    [DISCONTINUED] gabapentin (NEURONTIN) 300 MG capsule Take 2 capsules (600 mg total) by mouth every evening. 180 capsule 1    [DISCONTINUED] glimepiride (AMARYL) 1 MG tablet Take 1 tablet (1 mg total) by mouth daily with breakfast. 90 tablet 1    [DISCONTINUED] metFORMIN (GLUCOPHAGE-XR) 500 MG 24 hr tablet Take 2 tablets (1,000 mg total) by mouth 2 (two) times daily. 180 tablet 1    [DISCONTINUED] rosuvastatin (CRESTOR) 10 MG tablet Take 1 tablet (10 mg total) by mouth once daily. 90 tablet 1     No current  "facility-administered medications on file prior to visit.      Social History     Socioeconomic History    Marital status:      Spouse name: Not on file    Number of children: Not on file    Years of education: Not on file    Highest education level: Not on file   Occupational History    Occupation: retired accounting   Social Needs    Financial resource strain: Not on file    Food insecurity:     Worry: Not on file     Inability: Not on file    Transportation needs:     Medical: Not on file     Non-medical: Not on file   Tobacco Use    Smoking status: Never Smoker    Smokeless tobacco: Never Used   Substance and Sexual Activity    Alcohol use: No    Drug use: No    Sexual activity: Never   Lifestyle    Physical activity:     Days per week: Not on file     Minutes per session: Not on file    Stress: Not on file   Relationships    Social connections:     Talks on phone: Not on file     Gets together: Not on file     Attends Orthodox service: Not on file     Active member of club or organization: Not on file     Attends meetings of clubs or organizations: Not on file     Relationship status: Not on file   Other Topics Concern    Not on file   Social History Narrative    Not on file     Family History   Problem Relation Age of Onset    Heart disease Mother     Prostate cancer Father     Lung cancer Father     Ovarian cancer Sister     Colon cancer Brother 43    Cancer Brother         Colon    Kidney disease Brother         Kidney transplant    Diabetes Sister          OBJECTIVE:     Vitals:    10/22/19 0913   BP: 126/75   Pulse: 73   Temp: 98 °F (36.7 °C)   Weight: 77.6 kg (171 lb)   Height: 5' 5" (1.651 m)     Wt Readings from Last 3 Encounters:   10/22/19 77.6 kg (171 lb)   10/22/19 77.6 kg (171 lb)   06/18/19 76.8 kg (169 lb 5 oz)     APPEARANCE: Well nourished, well developed, in no acute distress.    HEAD: Normocephalic.  Atraumatic.  No sinus tenderness.  EYES:   Right eye: Pupil " reactive.  Conjunctiva clear.    Left eye: Pupil reactive.  Conjunctiva clear.    Both fundi:  Grossly normal to nondilated exam. EOMI.    EARS: TM's intact. Light reflex normal. No retraction or perforation.    NOSE:  clear.  MOUTH & THROAT:  No pharyngeal erythema or exudate. No lesions.  NECK: Supple. No bruits.  No JVD.  No cervical lymphadenopathy.  No thyromegaly.    CHEST: Breath sounds clear bilaterally.  Normal respiratory effort  CARDIOVASCULAR: Normal rate.  Regular rhythm.  No murmurs.  No rub.  No gallops.  ABDOMEN: Bowel sounds normal.  Soft.  No tenderness.  No organomegaly.  PERIPHERAL VASCULAR: No cyanosis.  No clubbing.  No edema.  NEUROLOGIC: No focal findings.  MENTAL STATUS: Alert.  Oriented x 3.

## 2019-10-23 ENCOUNTER — TELEPHONE (OUTPATIENT)
Dept: FAMILY MEDICINE | Facility: CLINIC | Age: 74
End: 2019-10-23

## 2019-10-23 DIAGNOSIS — E78.5 HYPERLIPIDEMIA, UNSPECIFIED HYPERLIPIDEMIA TYPE: Primary | ICD-10-CM

## 2019-10-23 DIAGNOSIS — E11.69 TYPE 2 DIABETES MELLITUS WITH OTHER SPECIFIED COMPLICATION, WITHOUT LONG-TERM CURRENT USE OF INSULIN: ICD-10-CM

## 2019-10-23 DIAGNOSIS — I10 HYPERTENSION, UNSPECIFIED TYPE: ICD-10-CM

## 2019-10-23 NOTE — TELEPHONE ENCOUNTER
----- Message from Parminder Sahni MD sent at 10/22/2019  5:32 PM CDT -----  Lab stable.Schedule lipid panel, CMP, hemoglobin A1c,  urine microalbumin within 6 months of the date of this last test.  My nurse will contact you to arrange.  Thanks,  Dr. Sahni

## 2019-11-05 DIAGNOSIS — E78.2 COMBINED HYPERLIPIDEMIA ASSOCIATED WITH TYPE 2 DIABETES MELLITUS: ICD-10-CM

## 2019-11-05 DIAGNOSIS — I25.10 CORONARY ARTERY DISEASE INVOLVING NATIVE CORONARY ARTERY OF NATIVE HEART WITHOUT ANGINA PECTORIS: Primary | ICD-10-CM

## 2019-11-05 DIAGNOSIS — E11.69 COMBINED HYPERLIPIDEMIA ASSOCIATED WITH TYPE 2 DIABETES MELLITUS: ICD-10-CM

## 2019-11-06 ENCOUNTER — OFFICE VISIT (OUTPATIENT)
Dept: CARDIOLOGY | Facility: CLINIC | Age: 74
End: 2019-11-06
Payer: MEDICARE

## 2019-11-06 ENCOUNTER — CLINICAL SUPPORT (OUTPATIENT)
Dept: CARDIOLOGY | Facility: CLINIC | Age: 74
End: 2019-11-06
Payer: MEDICARE

## 2019-11-06 VITALS
SYSTOLIC BLOOD PRESSURE: 124 MMHG | DIASTOLIC BLOOD PRESSURE: 68 MMHG | BODY MASS INDEX: 28.23 KG/M2 | HEIGHT: 65 IN | WEIGHT: 169.44 LBS | HEART RATE: 64 BPM

## 2019-11-06 DIAGNOSIS — N18.30 DIABETES MELLITUS WITH STAGE 3 CHRONIC KIDNEY DISEASE: ICD-10-CM

## 2019-11-06 DIAGNOSIS — E11.22 DIABETES MELLITUS WITH STAGE 3 CHRONIC KIDNEY DISEASE: ICD-10-CM

## 2019-11-06 DIAGNOSIS — E78.2 COMBINED HYPERLIPIDEMIA ASSOCIATED WITH TYPE 2 DIABETES MELLITUS: ICD-10-CM

## 2019-11-06 DIAGNOSIS — E11.59 HYPERTENSION ASSOCIATED WITH DIABETES: ICD-10-CM

## 2019-11-06 DIAGNOSIS — I25.10 CORONARY ARTERY DISEASE INVOLVING NATIVE CORONARY ARTERY OF NATIVE HEART WITHOUT ANGINA PECTORIS: ICD-10-CM

## 2019-11-06 DIAGNOSIS — E11.69 COMBINED HYPERLIPIDEMIA ASSOCIATED WITH TYPE 2 DIABETES MELLITUS: ICD-10-CM

## 2019-11-06 DIAGNOSIS — Z78.9 STATIN INTOLERANCE: ICD-10-CM

## 2019-11-06 DIAGNOSIS — I15.2 HYPERTENSION ASSOCIATED WITH DIABETES: ICD-10-CM

## 2019-11-06 DIAGNOSIS — Z95.1 S/P CABG X 4: Primary | ICD-10-CM

## 2019-11-06 PROCEDURE — 93000 EKG 12-LEAD: ICD-10-PCS | Mod: S$GLB,,, | Performed by: INTERNAL MEDICINE

## 2019-11-06 PROCEDURE — 99999 PR PBB SHADOW E&M-EST. PATIENT-LVL III: ICD-10-PCS | Mod: PBBFAC,,, | Performed by: INTERNAL MEDICINE

## 2019-11-06 PROCEDURE — 1100F PR PT FALLS ASSESS DOC 2+ FALLS/FALL W/INJURY/YR: ICD-10-PCS | Mod: S$GLB,,, | Performed by: INTERNAL MEDICINE

## 2019-11-06 PROCEDURE — 1100F PTFALLS ASSESS-DOCD GE2>/YR: CPT | Mod: S$GLB,,, | Performed by: INTERNAL MEDICINE

## 2019-11-06 PROCEDURE — 3078F PR MOST RECENT DIASTOLIC BLOOD PRESSURE < 80 MM HG: ICD-10-PCS | Mod: S$GLB,,, | Performed by: INTERNAL MEDICINE

## 2019-11-06 PROCEDURE — 99204 PR OFFICE/OUTPT VISIT, NEW, LEVL IV, 45-59 MIN: ICD-10-PCS | Mod: 25,S$GLB,, | Performed by: INTERNAL MEDICINE

## 2019-11-06 PROCEDURE — 99999 PR PBB SHADOW E&M-EST. PATIENT-LVL III: CPT | Mod: PBBFAC,,, | Performed by: INTERNAL MEDICINE

## 2019-11-06 PROCEDURE — 3288F FALL RISK ASSESSMENT DOCD: CPT | Mod: S$GLB,,, | Performed by: INTERNAL MEDICINE

## 2019-11-06 PROCEDURE — 3074F PR MOST RECENT SYSTOLIC BLOOD PRESSURE < 130 MM HG: ICD-10-PCS | Mod: S$GLB,,, | Performed by: INTERNAL MEDICINE

## 2019-11-06 PROCEDURE — 99204 OFFICE O/P NEW MOD 45 MIN: CPT | Mod: 25,S$GLB,, | Performed by: INTERNAL MEDICINE

## 2019-11-06 PROCEDURE — 3074F SYST BP LT 130 MM HG: CPT | Mod: S$GLB,,, | Performed by: INTERNAL MEDICINE

## 2019-11-06 PROCEDURE — 93000 ELECTROCARDIOGRAM COMPLETE: CPT | Mod: S$GLB,,, | Performed by: INTERNAL MEDICINE

## 2019-11-06 PROCEDURE — 3078F DIAST BP <80 MM HG: CPT | Mod: S$GLB,,, | Performed by: INTERNAL MEDICINE

## 2019-11-06 PROCEDURE — 3288F PR FALLS RISK ASSESSMENT DOCUMENTED: ICD-10-PCS | Mod: S$GLB,,, | Performed by: INTERNAL MEDICINE

## 2019-11-06 RX ORDER — EZETIMIBE 10 MG/1
10 TABLET ORAL DAILY
Qty: 90 TABLET | Refills: 0 | Status: SHIPPED | OUTPATIENT
Start: 2019-11-06

## 2019-11-06 RX ORDER — EZETIMIBE 10 MG/1
10 TABLET ORAL DAILY
COMMUNITY
Start: 2019-11-06 | End: 2019-11-06 | Stop reason: SDUPTHER

## 2019-11-06 RX ORDER — EZETIMIBE 10 MG/1
10 TABLET ORAL DAILY
Qty: 90 TABLET | Refills: 3 | Status: SHIPPED | OUTPATIENT
Start: 2019-11-06 | End: 2019-11-06 | Stop reason: CLARIF

## 2019-11-06 NOTE — LETTER
November 10, 2019      Parminder Sahni MD  [address]  [place] [address]           [facility]  [address] [place] [address]  Phone: [phone]  Fax: [phone]          Patient: Eunice Hutson   MR Number: [id number]   YOB: 1945   Date of Visit: 11/6/2019       Dear Dr. Parminder Sahni:    Thank you for referring Eunice Hutson to me for evaluation. Attached you will find relevant portions of my assessment and plan of care.    If you have questions, please do not hesitate to call me. I look forward to following Eunice Hutson along with you.    Sincerely,    Adrian Shah MD    Enclosure  CC:  No Recipients    If you would like to receive this communication electronically, please contact [email or web address] or [phone] to request more information on [employer] access.    For providers and/or their staff who would like to refer a patient to [facility], please contact us through our one-stop-shop provider referral line, [facility], at [phone].    If you feel you have received this communication in error or would no longer like to receive these types of communications, please e-mail [email or web address]

## 2019-11-06 NOTE — PROGRESS NOTES
Subjective:   Patient ID:  Eunice Hutson is a 74 y.o. female who presents for follow-up of No chief complaint on file.  Pt with 4 VCABG April-2019, [place], [place]. Pt with occasional SOB.Pt denies CP.    Hypertension   This is a chronic problem. The current episode started more than 1 year ago. The problem has been gradually improving since onset. The problem is controlled. Pertinent negatives include no chest pain, palpitations or shortness of breath. Past treatments include ACE inhibitors and beta blockers. The current treatment provides moderate improvement. There are no compliance problems.    Hyperlipidemia   This is a chronic problem. The current episode started more than 1 year ago. Factors aggravating her hyperlipidemia include beta blockers. Pertinent negatives include no chest pain or shortness of breath. Current antihyperlipidemic treatment includes statins. The current treatment provides moderate improvement of lipids. Compliance problems include medication side effects.    Coronary Artery Disease   Presents for follow-up visit. Pertinent negatives include no chest pain, chest pressure, chest tightness, dizziness, leg swelling, muscle weakness, palpitations, shortness of breath or weight gain. Risk factors include hyperlipidemia. The symptoms have been stable. Compliance with diet is variable. Compliance with exercise is variable. Compliance with medications is good.       Review of Systems   Constitution: Negative. Negative for weight gain.   HENT: Negative.    Eyes: Negative.    Cardiovascular: Negative.  Negative for chest pain, leg swelling and palpitations.   Respiratory: Negative.  Negative for chest tightness and shortness of breath.    Endocrine: Negative.    Hematologic/Lymphatic: Negative.    Skin: Negative.    Musculoskeletal: Negative for muscle weakness.   Gastrointestinal: Negative.    Genitourinary: Negative.    Neurological: Negative.  Negative for dizziness.    Psychiatric/Behavioral: Negative.    Allergic/Immunologic: Negative.      Family History   Problem Relation Age of Onset    Heart disease Mother     Prostate cancer Father     Lung cancer Father     Ovarian cancer Sister     Colon cancer Brother 43    Cancer Brother         Colon    Kidney disease Brother         Kidney transplant    Diabetes Sister      Past Medical History:   Diagnosis Date    Anemia     Blood transfusion     Chronic gastritis     Coronary artery disease involving native coronary artery of native heart with angina pectoris 3/1/2019    Depression     Diabetes mellitus with stage 3 chronic kidney disease     Diverticulosis     DM type 2 (diabetes mellitus, type 2) 2/27/2012    GERD (gastroesophageal reflux disease)     Glaucoma (increased eye pressure)     Hiatal hernia     Hyperlipidemia LDL goal < 100 2/27/2012    Hypertension goal BP (blood pressure) < 130/80 2/27/2012    NSTEMI (non-ST elevated myocardial infarction) 1/7/2018    RLS (restless legs syndrome)     Statin intolerance 1/15/2018     Social History     Socioeconomic History    Marital status:      Spouse name: Not on file    Number of children: Not on file    Years of education: Not on file    Highest education level: Not on file   Occupational History    Occupation: retired accounting   Social Needs    Financial resource strain: Not on file    Food insecurity:     Worry: Not on file     Inability: Not on file    Transportation needs:     Medical: Not on file     Non-medical: Not on file   Tobacco Use    Smoking status: Never Smoker    Smokeless tobacco: Never Used   Substance and Sexual Activity    Alcohol use: No    Drug use: No    Sexual activity: Never   Lifestyle    Physical activity:     Days per week: Not on file     Minutes per session: Not on file    Stress: Not on file   Relationships    Social connections:     Talks on phone: Not on file     Gets together: Not on file      Attends Sabianism service: Not on file     Active member of club or organization: Not on file     Attends meetings of clubs or organizations: Not on file     Relationship status: Not on file   Other Topics Concern    Not on file   Social History Narrative    Not on file     Current Outpatient Medications on File Prior to Visit   Medication Sig Dispense Refill    acetaminophen (TYLENOL) 325 MG tablet Take 650 mg by mouth.      aspirin (ECOTRIN) 81 MG EC tablet Take 81 mg by mouth once daily.      blood sugar diagnostic Strp To check BG one time daily, to use with insurance preferred meter 100 strip 3    blood-glucose meter kit To check BG one time daily, to use with insurance preferred meter 1 each 0    docusate calcium (SURFAK) 240 mg capsule Take 240 mg by mouth daily as needed.       gabapentin (NEURONTIN) 300 MG capsule Take 2 capsules (600 mg total) by mouth every evening. 180 capsule 3    glimepiride (AMARYL) 1 MG tablet Take 1 tablet (1 mg total) by mouth daily with breakfast. 90 tablet 3    latanoprost 0.005 % ophthalmic solution       lisinopril (PRINIVIL,ZESTRIL) 5 MG tablet Take 1 tablet (5 mg total) by mouth once daily. 90 tablet 3    metFORMIN (GLUCOPHAGE-XR) 500 MG 24 hr tablet Take 2 tablets (1,000 mg total) by mouth 2 (two) times daily. 180 tablet 3    metoprolol succinate (TOPROL-XL) 25 MG 24 hr tablet Take 1 tablet (25 mg total) by mouth once daily. 90 tablet 3    nitroGLYCERIN (NITROSTAT) 0.4 MG SL tablet Place 0.4 mg under the tongue.      ondansetron (ZOFRAN) 4 MG tablet Take 1 tablet (4 mg total) by mouth every 8 hours as needed for Nausea. 30 tablet 2    ONETOUCH DELICA LANCETS 33 gauge Misc       pantoprazole (PROTONIX) 40 MG tablet Take 1 tablet (40 mg total) by mouth once daily. 90 tablet 3    rOPINIRole (REQUIP) 1 MG tablet Take 1 tablet (1 mg total) by mouth nightly. 90 tablet 3    rosuvastatin (CRESTOR) 10 MG tablet Take 1 tablet (10 mg total) by mouth once daily. 90  "tablet 3    traMADol (ULTRAM) 50 mg tablet Take 1 tablet (50 mg total) by mouth every 6 (six) hours as needed for Pain. 28 tablet 0     No current facility-administered medications on file prior to visit.      Review of patient's allergies indicates:   Allergen Reactions    Atorvastatin Other (See Comments)     Other reaction(s): Other (See Comments)  Leg cramps  Leg cramps  Leg cramps    Caffeine Other (See Comments)     Other reaction(s): Other (See Comments)  Makes hyper  "Makes me high"  Makes hyper  Makes hyper  "Makes me high"    Codeine Itching     Other reaction(s): Rash  Able to take Lortab  Took a percocet  Other reaction(s): Rash  Able to take Lortab  Other reaction(s): Rash  Able to take Lortab  Took a percocet    Pravastatin Other (See Comments)     Other reaction(s): Other (See Comments)  Leg cramps  Leg cramps  Leg cramps    Pregabalin Hallucinations and Other (See Comments)     Other reaction(s): Other (See Comments)    Sulfamethoxazole-trimethoprim Other (See Comments)     Other reaction(s): Other (See Comments)       Objective:     Physical Exam   Constitutional: She is oriented to person, place, and time. She appears well-developed and well-nourished.   HENT:   Head: Normocephalic and atraumatic.   Eyes: Pupils are equal, round, and reactive to light. Conjunctivae and EOM are normal.   Neck: Normal range of motion. Neck supple.   Cardiovascular: Normal rate, regular rhythm, normal heart sounds and intact distal pulses.   Pulmonary/Chest: Effort normal and breath sounds normal.   Abdominal: Soft. Bowel sounds are normal.   Musculoskeletal: Normal range of motion.   Neurological: She is alert and oriented to person, place, and time.   Skin: Skin is warm and dry.   Psychiatric: She has a normal mood and affect.   Nursing note and vitals reviewed.      Assessment:     1. S/P CABG x 4    2. Hypertension associated with diabetes    3. Combined hyperlipidemia associated with type 2 diabetes " mellitus    4. Diabetes mellitus with stage 3 chronic kidney disease    5. Statin intolerance        Plan:     S/P CABG x 4    Hypertension associated with diabetes    Combined hyperlipidemia associated with type 2 diabetes mellitus    Diabetes mellitus with stage 3 chronic kidney disease    Statin intolerance      Continue asa- cad/cabg  Continue metoprolol, lisinopril-htn  Stop statin-hlp  Start zetia  echo

## 2019-11-21 ENCOUNTER — CLINICAL SUPPORT (OUTPATIENT)
Dept: CARDIOLOGY | Facility: CLINIC | Age: 74
End: 2019-11-21
Attending: INTERNAL MEDICINE
Payer: MEDICARE

## 2019-11-21 LAB
DIASTOLIC DYSFUNCTION: NO
ESTIMATED PA SYSTOLIC PRESSURE: 30.46
RETIRED EF AND QEF - SEE NOTES: 60 (ref 55–65)

## 2019-11-21 PROCEDURE — 93306 2D ECHO WITH COLOR FLOW DOPPLER: ICD-10-PCS | Mod: S$GLB,,, | Performed by: NUCLEAR MEDICINE

## 2019-11-21 PROCEDURE — 93306 TTE W/DOPPLER COMPLETE: CPT | Mod: S$GLB,,, | Performed by: NUCLEAR MEDICINE

## 2019-11-25 ENCOUNTER — TELEPHONE (OUTPATIENT)
Dept: CARDIOLOGY | Facility: CLINIC | Age: 74
End: 2019-11-25

## 2019-11-25 NOTE — TELEPHONE ENCOUNTER
called patient, verified .   Informed patient of test results   She stated her understanding and had no further questions ----- Message from Adrian Shah MD sent at 2019  4:58 AM CST -----  Echo is nml

## 2020-07-28 ENCOUNTER — PATIENT OUTREACH (OUTPATIENT)
Dept: ADMINISTRATIVE | Facility: HOSPITAL | Age: 75
End: 2020-07-28

## 2020-09-11 ENCOUNTER — PATIENT OUTREACH (OUTPATIENT)
Dept: ADMINISTRATIVE | Facility: HOSPITAL | Age: 75
End: 2020-09-11

## 2020-09-11 NOTE — PROGRESS NOTES
Working Due A1C Report.    Review of chart shows mammo completed in July per Care everywhere. Abstracted/entered July Dexa Scan.  Encounters show pt has changed pcp to [place].  Spoke with Ms Hutson and confirmed she has changed pcp. Chart updated.

## 2020-11-20 ENCOUNTER — PES CALL (OUTPATIENT)
Dept: ADMINISTRATIVE | Facility: CLINIC | Age: 75
End: 2020-11-20